# Patient Record
Sex: FEMALE | Race: WHITE | Employment: FULL TIME | URBAN - METROPOLITAN AREA
[De-identification: names, ages, dates, MRNs, and addresses within clinical notes are randomized per-mention and may not be internally consistent; named-entity substitution may affect disease eponyms.]

---

## 2017-07-14 ENCOUNTER — HOSPITAL ENCOUNTER (EMERGENCY)
Age: 32
Discharge: HOME | End: 2017-07-14
Attending: EMERGENCY MEDICINE
Payer: COMMERCIAL

## 2017-07-14 ENCOUNTER — APPOINTMENT (OUTPATIENT)
Dept: CT IMAGING | Age: 32
End: 2017-07-14
Attending: NURSE PRACTITIONER
Payer: COMMERCIAL

## 2017-07-14 VITALS
BODY MASS INDEX: 25.48 KG/M2 | RESPIRATION RATE: 16 BRPM | TEMPERATURE: 97.4 F | HEIGHT: 70 IN | OXYGEN SATURATION: 100 % | DIASTOLIC BLOOD PRESSURE: 91 MMHG | WEIGHT: 178 LBS | SYSTOLIC BLOOD PRESSURE: 127 MMHG | HEART RATE: 74 BPM

## 2017-07-14 DIAGNOSIS — K50.918 CROHN'S DISEASE WITH OTHER COMPLICATION, UNSPECIFIED GASTROINTESTINAL TRACT LOCATION (HCC): Primary | ICD-10-CM

## 2017-07-14 LAB
ALBUMIN SERPL BCP-MCNC: 3.9 G/DL (ref 3.5–5)
ALBUMIN/GLOB SERPL: 1 {RATIO} (ref 1.1–2.2)
ALP SERPL-CCNC: 71 U/L (ref 45–117)
ALT SERPL-CCNC: 18 U/L (ref 12–78)
ANION GAP BLD CALC-SCNC: 10 MMOL/L (ref 5–15)
APPEARANCE UR: CLEAR
AST SERPL W P-5'-P-CCNC: 17 U/L (ref 15–37)
BACTERIA URNS QL MICRO: ABNORMAL /HPF
BASOPHILS # BLD AUTO: 0 K/UL (ref 0–0.1)
BASOPHILS # BLD: 0 % (ref 0–1)
BILIRUB SERPL-MCNC: 0.3 MG/DL (ref 0.2–1)
BILIRUB UR QL CFM: NEGATIVE
BUN SERPL-MCNC: 12 MG/DL (ref 6–20)
BUN/CREAT SERPL: 13 (ref 12–20)
CALCIUM SERPL-MCNC: 9 MG/DL (ref 8.5–10.1)
CAOX CRY URNS QL MICRO: ABNORMAL
CHLORIDE SERPL-SCNC: 108 MMOL/L (ref 97–108)
CO2 SERPL-SCNC: 23 MMOL/L (ref 21–32)
COLOR UR: ABNORMAL
CREAT SERPL-MCNC: 0.94 MG/DL (ref 0.55–1.02)
DIFFERENTIAL METHOD BLD: ABNORMAL
EOSINOPHIL # BLD: 0.1 K/UL (ref 0–0.4)
EOSINOPHIL NFR BLD: 1 % (ref 0–7)
EPITH CASTS URNS QL MICRO: ABNORMAL /LPF
ERYTHROCYTE [DISTWIDTH] IN BLOOD BY AUTOMATED COUNT: 12.7 % (ref 11.5–14.5)
GLOBULIN SER CALC-MCNC: 4.1 G/DL (ref 2–4)
GLUCOSE SERPL-MCNC: 111 MG/DL (ref 65–100)
GLUCOSE UR STRIP.AUTO-MCNC: NEGATIVE MG/DL
HCG UR QL: NEGATIVE
HCT VFR BLD AUTO: 36.6 % (ref 35–47)
HGB BLD-MCNC: 11.9 G/DL (ref 11.5–16)
HGB UR QL STRIP: NEGATIVE
KETONES UR QL STRIP.AUTO: 15 MG/DL
LEUKOCYTE ESTERASE UR QL STRIP.AUTO: NEGATIVE
LYMPHOCYTES # BLD AUTO: 16 % (ref 12–49)
LYMPHOCYTES # BLD: 1.8 K/UL (ref 0.8–3.5)
MCH RBC QN AUTO: 29.5 PG (ref 26–34)
MCHC RBC AUTO-ENTMCNC: 32.5 G/DL (ref 30–36.5)
MCV RBC AUTO: 90.6 FL (ref 80–99)
MONOCYTES # BLD: 0.6 K/UL (ref 0–1)
MONOCYTES NFR BLD AUTO: 5 % (ref 5–13)
NEUTS SEG # BLD: 9 K/UL (ref 1.8–8)
NEUTS SEG NFR BLD AUTO: 78 % (ref 32–75)
NITRITE UR QL STRIP.AUTO: NEGATIVE
PH UR STRIP: 5.5 [PH] (ref 5–8)
PLATELET # BLD AUTO: 233 K/UL (ref 150–400)
POTASSIUM SERPL-SCNC: 3.2 MMOL/L (ref 3.5–5.1)
PROT SERPL-MCNC: 8 G/DL (ref 6.4–8.2)
PROT UR STRIP-MCNC: 30 MG/DL
RBC # BLD AUTO: 4.04 M/UL (ref 3.8–5.2)
RBC #/AREA URNS HPF: ABNORMAL /HPF (ref 0–5)
RBC MORPH BLD: ABNORMAL
SODIUM SERPL-SCNC: 141 MMOL/L (ref 136–145)
SP GR UR REFRACTOMETRY: 1.02 (ref 1–1.03)
UA: UC IF INDICATED,UAUC: ABNORMAL
UROBILINOGEN UR QL STRIP.AUTO: 2 EU/DL (ref 0.2–1)
WBC # BLD AUTO: 11.5 K/UL (ref 3.6–11)
WBC URNS QL MICRO: ABNORMAL /HPF (ref 0–4)

## 2017-07-14 PROCEDURE — 81025 URINE PREGNANCY TEST: CPT

## 2017-07-14 PROCEDURE — 81001 URINALYSIS AUTO W/SCOPE: CPT | Performed by: NURSE PRACTITIONER

## 2017-07-14 PROCEDURE — 96361 HYDRATE IV INFUSION ADD-ON: CPT

## 2017-07-14 PROCEDURE — 74011250636 HC RX REV CODE- 250/636: Performed by: NURSE PRACTITIONER

## 2017-07-14 PROCEDURE — 96375 TX/PRO/DX INJ NEW DRUG ADDON: CPT

## 2017-07-14 PROCEDURE — 87086 URINE CULTURE/COLONY COUNT: CPT | Performed by: NURSE PRACTITIONER

## 2017-07-14 PROCEDURE — 85025 COMPLETE CBC W/AUTO DIFF WBC: CPT | Performed by: NURSE PRACTITIONER

## 2017-07-14 PROCEDURE — 74011636320 HC RX REV CODE- 636/320: Performed by: RADIOLOGY

## 2017-07-14 PROCEDURE — 99283 EMERGENCY DEPT VISIT LOW MDM: CPT

## 2017-07-14 PROCEDURE — 74011250637 HC RX REV CODE- 250/637: Performed by: NURSE PRACTITIONER

## 2017-07-14 PROCEDURE — 80053 COMPREHEN METABOLIC PANEL: CPT | Performed by: NURSE PRACTITIONER

## 2017-07-14 PROCEDURE — 36415 COLL VENOUS BLD VENIPUNCTURE: CPT | Performed by: NURSE PRACTITIONER

## 2017-07-14 PROCEDURE — 74177 CT ABD & PELVIS W/CONTRAST: CPT

## 2017-07-14 PROCEDURE — 96374 THER/PROPH/DIAG INJ IV PUSH: CPT

## 2017-07-14 RX ORDER — ONDANSETRON 2 MG/ML
4 INJECTION INTRAMUSCULAR; INTRAVENOUS
Status: COMPLETED | OUTPATIENT
Start: 2017-07-14 | End: 2017-07-14

## 2017-07-14 RX ORDER — ONDANSETRON 4 MG/1
4 TABLET, ORALLY DISINTEGRATING ORAL
Qty: 20 TAB | Refills: 0 | Status: SHIPPED | OUTPATIENT
Start: 2017-07-14 | End: 2019-12-31

## 2017-07-14 RX ORDER — OXYCODONE AND ACETAMINOPHEN 5; 325 MG/1; MG/1
1 TABLET ORAL
Status: COMPLETED | OUTPATIENT
Start: 2017-07-14 | End: 2017-07-14

## 2017-07-14 RX ORDER — OXYCODONE AND ACETAMINOPHEN 5; 325 MG/1; MG/1
1 TABLET ORAL
Qty: 8 TAB | Refills: 0 | Status: SHIPPED | OUTPATIENT
Start: 2017-07-14 | End: 2018-05-30

## 2017-07-14 RX ORDER — KETOROLAC TROMETHAMINE 30 MG/ML
30 INJECTION, SOLUTION INTRAMUSCULAR; INTRAVENOUS
Status: COMPLETED | OUTPATIENT
Start: 2017-07-14 | End: 2017-07-14

## 2017-07-14 RX ADMIN — ONDANSETRON 4 MG: 2 INJECTION INTRAMUSCULAR; INTRAVENOUS at 22:03

## 2017-07-14 RX ADMIN — IOPAMIDOL 100 ML: 755 INJECTION, SOLUTION INTRAVENOUS at 22:58

## 2017-07-14 RX ADMIN — OXYCODONE HYDROCHLORIDE AND ACETAMINOPHEN 1 TABLET: 5; 325 TABLET ORAL at 23:33

## 2017-07-14 RX ADMIN — KETOROLAC TROMETHAMINE 30 MG: 30 INJECTION, SOLUTION INTRAMUSCULAR at 22:03

## 2017-07-14 RX ADMIN — SODIUM CHLORIDE 1000 ML: 900 INJECTION, SOLUTION INTRAVENOUS at 22:03

## 2017-07-15 NOTE — ED PROVIDER NOTES
HPI Comments: Arianna Fournier is a 27 y.o. female who [place] [facility] [place] of abdominal pain, lightheadedness, and nausea. Pt reports that felt \"band like\" abdominal pain which started in her lower abdomen and radiated to her lower back. She reports pain was consistent throughout course of the day and she had associated symptom of nausea. She reports amenorrhea since IUD placement but felt the pain was similar to menstrual cramps. Pt notes that she went out to eat with her  tonight and did not feel well while at dinner. She states the pain intensified radiated to her epigastric region and she \"felt like I was going to pass out. \" She reports that she was walking around and had been walking around for some time when she became lightheaded, dizzy, and grabbed onto an object as she felt she was going to have a syncopal episode. She had never had anything like this occur in the past. She states that she then came to the ED. No recent abdominal surgeries, denies history of abdominal disease such as IBS, UC, Crohn's. Pt reports NB daily BM. No f/c, vaginal discharge/ spotting, or vomiting/ diarrhea. (-) tobacco/ ETOH/ substance abuse history. PCP: Kristian Vázquez MD    There are no other complaints, changes or physical findings at this time. The history is provided by the patient. Past Medical History:   Diagnosis Date    Fatigue     Headache        Past Surgical History:   Procedure Laterality Date    HX HEENT           Family History:   Problem Relation Age of Onset    Cancer Maternal Grandmother     Headache Maternal Grandmother     Cancer Paternal Grandmother     Cancer Other        Social History     Social History    Marital status:      Spouse name: N/A    Number of children: N/A    Years of education: N/A     Occupational History    Not on file.      Social History Main Topics    Smoking status: Never Smoker    Smokeless tobacco: Not on file    Alcohol use Yes    Drug use: Not on file    Sexual activity: Not on file     Other Topics Concern    Not on file     Social History Narrative         ALLERGIES: Review of patient's allergies indicates no known allergies. Review of Systems   Constitutional: Negative for activity change, appetite change, chills and fever. HENT: Negative for congestion, rhinorrhea, sinus pressure, sneezing and sore throat. Eyes: Negative for pain, discharge and visual disturbance. Respiratory: Negative for cough and shortness of breath. Cardiovascular: Negative for chest pain. Gastrointestinal: Positive for abdominal pain and nausea. Negative for diarrhea and vomiting. Genitourinary: Negative for dysuria, flank pain, frequency and urgency. Musculoskeletal: Negative for arthralgias, back pain, gait problem, joint swelling, myalgias and neck pain. Skin: Negative for color change and rash. Neurological: Negative for dizziness, speech difficulty, weakness, light-headedness, numbness and headaches. Psychiatric/Behavioral: Negative for agitation, behavioral problems and confusion. All other systems reviewed and are negative. Vitals:    07/14/17 2137   BP: 136/87   Pulse: 86   Resp: 16   Temp: 97.4 °F (36.3 °C)   SpO2: 100%   Weight: 80.7 kg (178 lb)   Height: 5' 10\" (1.778 m)            Physical Exam   Constitutional: She is oriented to person, place, and time. She appears well-developed and well-nourished. No distress. HENT:   Head: Normocephalic and atraumatic. Right Ear: External ear normal.   Left Ear: External ear normal.   Nose: Nose normal.   Mouth/Throat: Oropharynx is clear and moist. No oropharyngeal exudate. Eyes: Conjunctivae and EOM are normal. Pupils are equal, round, and reactive to light. Neck: Normal range of motion. Neck supple. Cardiovascular: Normal rate, regular rhythm, normal heart sounds and intact distal pulses.     Pulmonary/Chest: Effort normal and breath sounds normal.   Abdominal: Soft. Bowel sounds are normal. There is tenderness (RLQ ). There is no rebound and no guarding. Musculoskeletal: Normal range of motion. Neurological: She is alert and oriented to person, place, and time. Skin: Skin is warm and dry. Psychiatric: She has a normal mood and affect. Her behavior is normal. Judgment and thought content normal.   Nursing note and vitals reviewed. MDM  Number of Diagnoses or Management Options  Crohn's disease with other complication, unspecified gastrointestinal tract location [facility]):   Diagnosis management comments: DDx: UTI, kidney stone, pyelonephritis, UC, Chron's, ovarian cyst     28 yo F presents with band like abdominal pain x1d. Nausea but no vomiting. Labs unremarkable, CT with concern for Chron's disease. Pt states no family hx of this and no dx of this in the past.  Provided with education on Chron's and need for GI f/u. Pt agreeable to set up f/u ASAP. Reasons to return to ED reviewed/ provided. Pt reports improved symptoms at time of hospital d/c. Amount and/or Complexity of Data Reviewed  Clinical lab tests: ordered and reviewed  Tests in the radiology section of CPT®: ordered and reviewed  Review and summarize past medical records: yes  Discuss the patient with other providers: yes      ED Course       Procedures    LABORATORY TESTS:  Recent Results (from the past 12 hour(s))   CBC WITH AUTOMATED DIFF    Collection Time: 07/14/17  9:51 PM   Result Value Ref Range    WBC 11.5 (H) 3.6 - 11.0 K/uL    RBC 4.04 3.80 - 5.20 M/uL    HGB 11.9 11.5 - 16.0 g/dL    HCT 36.6 35.0 - 47.0 %    MCV 90.6 80.0 - 99.0 FL    MCH 29.5 26.0 - 34.0 PG    MCHC 32.5 30.0 - 36.5 g/dL    RDW 12.7 11.5 - 14.5 %    PLATELET 562 270 - 051 K/uL    NEUTROPHILS 78 (H) 32 - 75 %    LYMPHOCYTES 16 12 - 49 %    MONOCYTES 5 5 - 13 %    EOSINOPHILS 1 0 - 7 %    BASOPHILS 0 0 - 1 %    ABS. NEUTROPHILS 9.0 (H) 1.8 - 8.0 K/UL    ABS.  LYMPHOCYTES 1.8 0.8 - 3.5 K/UL ABS. MONOCYTES 0.6 0.0 - 1.0 K/UL    ABS. EOSINOPHILS 0.1 0.0 - 0.4 K/UL    ABS. BASOPHILS 0.0 0.0 - 0.1 K/UL    DF MANUAL      RBC COMMENTS NORMOCYTIC, NORMOCHROMIC     METABOLIC PANEL, COMPREHENSIVE    Collection Time: 07/14/17  9:51 PM   Result Value Ref Range    Sodium 141 136 - 145 mmol/L    Potassium 3.2 (L) 3.5 - 5.1 mmol/L    Chloride 108 97 - 108 mmol/L    CO2 23 21 - 32 mmol/L    Anion gap 10 5 - 15 mmol/L    Glucose 111 (H) 65 - 100 mg/dL    BUN 12 6 - 20 MG/DL    Creatinine 0.94 0.55 - 1.02 MG/DL    BUN/Creatinine ratio 13 12 - 20      GFR est AA >60 >60 ml/min/1.73m2    GFR est non-AA >60 >60 ml/min/1.73m2    Calcium 9.0 8.5 - 10.1 MG/DL    Bilirubin, total 0.3 0.2 - 1.0 MG/DL    ALT (SGPT) 18 12 - 78 U/L    AST (SGOT) 17 15 - 37 U/L    Alk.  phosphatase 71 45 - 117 U/L    Protein, total 8.0 6.4 - 8.2 g/dL    Albumin 3.9 3.5 - 5.0 g/dL    Globulin 4.1 (H) 2.0 - 4.0 g/dL    A-G Ratio 1.0 (L) 1.1 - 2.2     HCG URINE, QL. - POC    Collection Time: 07/14/17 10:07 PM   Result Value Ref Range    Pregnancy test,urine (POC) NEGATIVE  NEG     URINALYSIS W/ REFLEX CULTURE    Collection Time: 07/14/17 10:25 PM   Result Value Ref Range    Color YELLOW/STRAW      Appearance CLEAR CLEAR      Specific gravity 1.025 1.003 - 1.030      pH (UA) 5.5 5.0 - 8.0      Protein 30 (A) NEG mg/dL    Glucose NEGATIVE  NEG mg/dL    Ketone 15 (A) NEG mg/dL    Blood NEGATIVE  NEG      Urobilinogen 2.0 (H) 0.2 - 1.0 EU/dL    Nitrites NEGATIVE  NEG      Leukocyte Esterase NEGATIVE  NEG      Bilirubin UA, confirm NEGATIVE  NEG      WBC 0-4 0 - 4 /hpf    RBC 0-5 0 - 5 /hpf    Epithelial cells MODERATE (A) FEW /lpf    Bacteria 1+ (A) NEG /hpf    UA:UC IF INDICATED URINE CULTURE ORDERED (A) CNI      CA Oxalate crystals FEW (A) NEG         IMAGING RESULTS:  CT ABD PELV W CONT   Final Result      EXAM:  CT ABD PELV W CONT     INDICATION: Right lower quadrant pain today     COMPARISON: None     CONTRAST:  100 mL of Isovue-370.     TECHNIQUE:   Following the uneventful intravenous administration of 100 cc Isovue-370, thin  axial images were obtained through the abdomen and pelvis. Coronal and sagittal  reconstructions were generated. Oral contrast was not administered. CT dose  reduction was achieved through use of a standardized protocol tailored for this  examination and automatic exposure control for dose modulation.     FINDINGS:   LUNG BASES: Clear. INCIDENTALLY IMAGED HEART AND MEDIASTINUM: Unremarkable. LIVER: Periportal edema is noted likely related to administration of fluid. GALLBLADDER: Contracted but no stones are identified. SPLEEN: No mass. PANCREAS: No mass or ductal dilatation. ADRENALS: Unremarkable. KIDNEYS: No mass, calculus, or hydronephrosis. STOMACH: Unremarkable. SMALL BOWEL: There is wall thickening of the terminal ileum with associated  inflammation. There is dilatation of the distal ileum. COLON: No dilatation or wall thickening. APPENDIX: Unremarkable. PERITONEUM: No ascites or pneumoperitoneum. RETROPERITONEUM: No lymphadenopathy or aortic aneurysm. REPRODUCTIVE ORGANS: Intrauterine device projects in satisfactory position. URINARY BLADDER: No mass or calculus. BONES: No destructive bone lesion. ADDITIONAL COMMENTS: N/A     IMPRESSION  IMPRESSION:  Inflammation involving the terminal ileum with mild dilatation of the distal  ileum. There is associated inflammation. Findings are concerning for Crohn's  disease.     MEDICATIONS GIVEN:  Medications   sodium chloride 0.9 % bolus infusion 1,000 mL (0 mL IntraVENous IV Completed 7/14/17 2303)   ketorolac (TORADOL) injection 30 mg (30 mg IntraVENous Given 7/14/17 2203)   ondansetron (ZOFRAN) injection 4 mg (4 mg IntraVENous Given 7/14/17 2203)   iopamidol (ISOVUE-370) 76 % injection 100 mL (100 mL IntraVENous Given 7/14/17 2258)   oxyCODONE-acetaminophen (PERCOCET) 5-325 mg per tablet 1 Tab (1 Tab Oral Given 7/14/17 2333) IMPRESSION:  1. Crohn's disease with other complication, unspecified gastrointestinal tract location ([facility] [address]        PLAN:  1. Discharge Medication List as of 7/14/2017 11:19 PM      START taking these medications    Details   oxyCODONE-acetaminophen (PERCOCET) 5-325 mg per tablet Take 1 Tab by mouth every four (4) hours as needed for Pain. Max Daily Amount: 6 Tabs., Print, Disp-8 Tab, R-0      ondansetron (ZOFRAN ODT) 4 mg disintegrating tablet Take 1 Tab by mouth every eight (8) hours as needed for Nausea. , Print, Disp-20 Tab, R-0         CONTINUE these medications which have NOT CHANGED    Details   topiramate (TOPAMAX) 100 mg tablet Take 1 Tab by mouth nightly., Normal, Disp-30 Tab, R-3           2. Follow-up Information     Follow up With Details Comments [address], [place] Schedule an appointment as soon as possible for a visit for GI follow up  [facility]  [phone]      [facility] EMERGENCY DEPT Go to As needed, If symptoms worsen [address]    Will Briones MD Schedule an appointment as soon as possible for a visit  [facility]  [phone]          3. Return to ED if worse     Discharge Note:    The patient is ready for discharge. The patient's signs, symptoms, diagnosis, and discharge instruction have been discussed and the patient has conveyed their understanding. The patient is to follow up as recommended or return to the ER should their symptoms worsen. Plan has been discussed and the patient is in agreement.     Epi Smith NP

## 2017-07-15 NOTE — DISCHARGE INSTRUCTIONS
We hope that we have addressed all of your medical concerns. The examination and treatment you received in the Emergency Department were for an emergent problem and were not intended as complete care. It is important that you follow up with your healthcare provider(s) for ongoing care. If your symptoms worsen or do not improve as expected, and you are unable to reach your usual health care provider(s), you should return to the Emergency Department. Today's healthcare is undergoing tremendous change, and patient satisfaction surveys are one of the many tools to assess the quality of medical care. You may receive a survey from the [employer] regarding your experience in the Emergency Department. I hope that your experience has been completely positive, particularly the medical care that I provided. As such, please participate in the survey; anything less than excellent does not meet my expectations or intentions. Formerly [facility] and [employer] participate in nationally recognized quality of care measures. If your blood pressure is greater than 120/80, as reported below, we urge that you seek medical care to address the potential of high blood pressure, commonly known as hypertension. Hypertension can be hereditary or can be caused by certain medical conditions, pain, stress, or \"white coat syndrome. \"       Please make an appointment with your health care provider(s) for follow up of your Emergency Department visit. VITALS:   Patient Vitals for the past 8 hrs:   Temp Pulse Resp BP SpO2   07/14/17 2137 97.4 °F (36.3 °C) 86 16 136/87 100 %          Thank you for allowing us to provide you with medical care today. We realize that you have many choices for your emergency care needs. Please choose us in the future for any continued health care needs. Kendra Toro  [address]   Office: [phone]            Recent Results (from the past 24 hour(s))   CBC WITH AUTOMATED DIFF    Collection Time: 07/14/17  9:51 PM   Result Value Ref Range    WBC 11.5 (H) 3.6 - 11.0 K/uL    RBC 4.04 3.80 - 5.20 M/uL    HGB 11.9 11.5 - 16.0 g/dL    HCT 36.6 35.0 - 47.0 %    MCV 90.6 80.0 - 99.0 FL    MCH 29.5 26.0 - 34.0 PG    MCHC 32.5 30.0 - 36.5 g/dL    RDW 12.7 11.5 - 14.5 %    PLATELET 202 758 - 736 K/uL    NEUTROPHILS 78 (H) 32 - 75 %    LYMPHOCYTES 16 12 - 49 %    MONOCYTES 5 5 - 13 %    EOSINOPHILS 1 0 - 7 %    BASOPHILS 0 0 - 1 %    ABS. NEUTROPHILS 9.0 (H) 1.8 - 8.0 K/UL    ABS. LYMPHOCYTES 1.8 0.8 - 3.5 K/UL    ABS. MONOCYTES 0.6 0.0 - 1.0 K/UL    ABS. EOSINOPHILS 0.1 0.0 - 0.4 K/UL    ABS. BASOPHILS 0.0 0.0 - 0.1 K/UL    DF MANUAL      RBC COMMENTS NORMOCYTIC, NORMOCHROMIC     METABOLIC PANEL, COMPREHENSIVE    Collection Time: 07/14/17  9:51 PM   Result Value Ref Range    Sodium 141 136 - 145 mmol/L    Potassium 3.2 (L) 3.5 - 5.1 mmol/L    Chloride 108 97 - 108 mmol/L    CO2 23 21 - 32 mmol/L    Anion gap 10 5 - 15 mmol/L    Glucose 111 (H) 65 - 100 mg/dL    BUN 12 6 - 20 MG/DL    Creatinine 0.94 0.55 - 1.02 MG/DL    BUN/Creatinine ratio 13 12 - 20      GFR est AA >60 >60 ml/min/1.73m2    GFR est non-AA >60 >60 ml/min/1.73m2    Calcium 9.0 8.5 - 10.1 MG/DL    Bilirubin, total 0.3 0.2 - 1.0 MG/DL    ALT (SGPT) 18 12 - 78 U/L    AST (SGOT) 17 15 - 37 U/L    Alk.  phosphatase 71 45 - 117 U/L    Protein, total 8.0 6.4 - 8.2 g/dL    Albumin 3.9 3.5 - 5.0 g/dL    Globulin 4.1 (H) 2.0 - 4.0 g/dL    A-G Ratio 1.0 (L) 1.1 - 2.2     HCG URINE, QL. - POC    Collection Time: 07/14/17 10:07 PM   Result Value Ref Range    Pregnancy test,urine (POC) NEGATIVE  NEG     URINALYSIS W/ REFLEX CULTURE    Collection Time: 07/14/17 10:25 PM   Result Value Ref Range    Color YELLOW/STRAW      Appearance CLEAR CLEAR      Specific gravity 1.025 1.003 - 1.030      pH (UA) 5.5 5.0 - 8.0      Protein 30 (A) NEG mg/dL    Glucose NEGATIVE  NEG mg/dL    Ketone 15 (A) NEG mg/dL    Blood NEGATIVE  NEG      Urobilinogen 2.0 (H) 0.2 - 1.0 EU/dL    Nitrites NEGATIVE  NEG      Leukocyte Esterase NEGATIVE  NEG      Bilirubin UA, confirm NEGATIVE  NEG      WBC 0-4 0 - 4 /hpf    RBC 0-5 0 - 5 /hpf    Epithelial cells MODERATE (A) FEW /lpf    Bacteria 1+ (A) NEG /hpf    UA:UC IF INDICATED URINE CULTURE ORDERED (A) CNI      CA Oxalate crystals FEW (A) NEG         Ct Abd Pelv W Cont    Result Date: 7/14/2017  EXAM:  CT ABD PELV W CONT INDICATION: Right lower quadrant pain today COMPARISON: None CONTRAST:  100 mL of Isovue-370. TECHNIQUE: Following the uneventful intravenous administration of 100 cc Isovue-370, thin axial images were obtained through the abdomen and pelvis. Coronal and sagittal reconstructions were generated. Oral contrast was not administered. CT dose reduction was achieved through use of a standardized protocol tailored for this examination and automatic exposure control for dose modulation. FINDINGS: LUNG BASES: Clear. INCIDENTALLY IMAGED HEART AND MEDIASTINUM: Unremarkable. LIVER: Periportal edema is noted likely related to administration of fluid. GALLBLADDER: Contracted but no stones are identified. SPLEEN: No mass. PANCREAS: No mass or ductal dilatation. ADRENALS: Unremarkable. KIDNEYS: No mass, calculus, or hydronephrosis. STOMACH: Unremarkable. SMALL BOWEL: There is wall thickening of the terminal ileum with associated inflammation. There is dilatation of the distal ileum. COLON: No dilatation or wall thickening. APPENDIX: Unremarkable. PERITONEUM: No ascites or pneumoperitoneum. RETROPERITONEUM: No lymphadenopathy or aortic aneurysm. REPRODUCTIVE ORGANS: Intrauterine device projects in satisfactory position. URINARY BLADDER: No mass or calculus. BONES: No destructive bone lesion. ADDITIONAL COMMENTS: N/A     IMPRESSION: Inflammation involving the terminal ileum with mild dilatation of the distal ileum.  There is associated inflammation. Findings are concerning for Crohn's disease. Crohn's Disease: Care Instructions  Your Care Instructions    Crohn's disease is a lifelong inflammatory bowel disease (IBD). Parts of the digestive tract get swollen and irritated and may develop deep sores called ulcers. Crohn's disease usually occurs in the last part of the small intestine and the first part of the large intestine. But it can develop anywhere from the mouth to the anus. The main symptoms of Crohn's disease are belly pain, diarrhea, fever, and weight loss. Some people may have constipation. Crohn's disease also sometimes causes problems with the joints, eyes, or skin. Your symptoms may be mild at some times and severe at others. The disease can also go into remission, which means that it is not active and you have no symptoms. Bad attacks of Crohn's disease often have to be treated in the hospital so that you can get medicines and liquids through a tube in your vein, called an IV. This gives your digestive system time to rest and recover. Talk with your doctor about the best treatments for you. You may need medicines that help prevent or treat flare-ups of the disease. You may need surgery to remove part of your bowel if you have an abnormal opening in the bowel (fistula), an abscess, or a bowel obstruction. In some cases, surgery is needed if medicines do not work. But symptoms often return to other areas of the intestines after surgery. Learning good self-care can help you reduce your symptoms and manage Crohn's disease. Follow-up care is a key part of your treatment and safety. Be sure to make and go to all appointments, and call your doctor if you are having problems. Its also a good idea to know your test results and keep a list of the medicines you take. How can you care for yourself at home? · Take your medicines exactly as prescribed. Call your doctor if you think you are having a problem with your medicine.  You will get more details on the specific medicines your doctor prescribes. · Do not take anti-inflammatory medicines, such as aspirin, ibuprofen (Advil, Motrin), or naproxen (Aleve). They may make your symptoms worse. Do not take any other medicines or herbal products without talking to your doctor first.  · Avoid foods that make your symptoms worse. These might include milk, alcohol, high-fiber foods, or spicy foods. · Eat a healthy diet. Make sure to get enough iron. Rectal bleeding may make you lose iron. Good sources of iron include beef, lentils, spinach, raisins, and iron-enriched breads and cereals. · Drink liquid meal replacements if your doctor recommends them. These are high in calories and contain vitamins and minerals. Severe symptoms may make it hard for your body to absorb vitamins and minerals. · Do not smoke. Smoking makes Crohn's disease worse. If you need help quitting, talk to your doctor about stop-smoking programs and medicines. These can increase your chances of quitting for good. · Seek support from friends and family to help cope with Crohn's disease. The illness can affect all parts of your life. Get counseling if you need it. When should you call for help? Call 911 anytime you think you may need emergency care. For example, call if:  · You have severe belly pain. · You passed out (lost consciousness). Call your doctor now or seek immediate medical care if:  · You have signs of needing more fluids. You have sunken eyes and a dry mouth, and you pass only a little dark urine. · You have pain and swelling in the anal area, or you have pus draining from the anal area. · You have a fever or shaking chills. · Your belly is bloated. Watch closely for changes in your health, and be sure to contact your doctor if:  · Your symptoms get worse. · You have diarrhea for more than 2 weeks. · Your pain is not steadily getting better. · You have unexplained weight loss.   Where can you learn more?  Go to http://lul-malick.info/. Enter 21  in the search box to learn more about \"Crohn's Disease: Care Instructions. \"  Current as of: August 9, 2016  Content Version: 11.3  © 9445-8859 [employer]. Care instructions adapted under license by [employer] (which disclaims liability or warranty for this information). If you have questions about a medical condition or this instruction, always ask your healthcare professional. [place] [address] any warranty or liability for your use of this information.

## 2017-07-15 NOTE — ED TRIAGE NOTES
Patient reports abdominal pain and back pain throughout the day, went to dinner and pain became much worse causing her to become dizzy and feel faint. Denies N/V. States feels like \"a band\" around her.

## 2017-07-16 LAB
BACTERIA SPEC CULT: NORMAL
CC UR VC: NORMAL
SERVICE CMNT-IMP: NORMAL

## 2017-12-08 ENCOUNTER — HOSPITAL ENCOUNTER (OUTPATIENT)
Dept: GENERAL RADIOLOGY | Age: 32
Discharge: HOME | End: 2017-12-08
Attending: INTERNAL MEDICINE
Payer: COMMERCIAL

## 2017-12-08 DIAGNOSIS — K50.00 CROHN'S DISEASE INVOLVING TERMINAL ILEUM (HCC): ICD-10-CM

## 2017-12-08 PROCEDURE — 74249 XR UPPER GI AIR CONT/SMALL BOWEL: CPT

## 2018-05-30 ENCOUNTER — APPOINTMENT (OUTPATIENT)
Dept: CT IMAGING | Age: 33
DRG: 387 | End: 2018-05-30
Attending: EMERGENCY MEDICINE
Payer: COMMERCIAL

## 2018-05-30 ENCOUNTER — HOSPITAL ENCOUNTER (INPATIENT)
Age: 33
LOS: 2 days | Discharge: HOME | DRG: 387 | End: 2018-06-01
Attending: EMERGENCY MEDICINE | Admitting: INTERNAL MEDICINE
Payer: COMMERCIAL

## 2018-05-30 DIAGNOSIS — K50.919 ACUTE CROHN'S DISEASE WITH COMPLICATION (HCC): ICD-10-CM

## 2018-05-30 DIAGNOSIS — R10.31 ABDOMINAL PAIN, RIGHT LOWER QUADRANT: Primary | ICD-10-CM

## 2018-05-30 DIAGNOSIS — D72.829 LEUKOCYTOSIS, UNSPECIFIED TYPE: ICD-10-CM

## 2018-05-30 PROBLEM — R10.9 ABDOMINAL PAIN: Status: ACTIVE | Noted: 2018-05-30

## 2018-05-30 PROBLEM — K50.90 CROHN'S DISEASE (HCC): Status: ACTIVE | Noted: 2018-05-30

## 2018-05-30 PROBLEM — R11.0 NAUSEA: Status: ACTIVE | Noted: 2018-05-30

## 2018-05-30 LAB
ALBUMIN SERPL-MCNC: 3.6 G/DL (ref 3.5–5)
ALBUMIN/GLOB SERPL: 0.9 {RATIO} (ref 1.1–2.2)
ALP SERPL-CCNC: 49 U/L (ref 45–117)
ALT SERPL-CCNC: 15 U/L (ref 12–78)
ANION GAP SERPL CALC-SCNC: 7 MMOL/L (ref 5–15)
APPEARANCE UR: ABNORMAL
AST SERPL-CCNC: 24 U/L (ref 15–37)
BACTERIA URNS QL MICRO: NEGATIVE /HPF
BASOPHILS # BLD: 0 K/UL (ref 0–0.1)
BASOPHILS NFR BLD: 0 % (ref 0–1)
BILIRUB SERPL-MCNC: 0.4 MG/DL (ref 0.2–1)
BILIRUB UR QL: NEGATIVE
BUN SERPL-MCNC: 9 MG/DL (ref 6–20)
BUN/CREAT SERPL: 13 (ref 12–20)
CALCIUM SERPL-MCNC: 9 MG/DL (ref 8.5–10.1)
CHLORIDE SERPL-SCNC: 105 MMOL/L (ref 97–108)
CO2 SERPL-SCNC: 26 MMOL/L (ref 21–32)
COLOR UR: ABNORMAL
CREAT SERPL-MCNC: 0.72 MG/DL (ref 0.55–1.02)
DIFFERENTIAL METHOD BLD: ABNORMAL
EOSINOPHIL # BLD: 0.1 K/UL (ref 0–0.4)
EOSINOPHIL NFR BLD: 1 % (ref 0–7)
EPITH CASTS URNS QL MICRO: ABNORMAL /LPF
ERYTHROCYTE [DISTWIDTH] IN BLOOD BY AUTOMATED COUNT: 11.6 % (ref 11.5–14.5)
GLOBULIN SER CALC-MCNC: 3.8 G/DL (ref 2–4)
GLUCOSE SERPL-MCNC: 96 MG/DL (ref 65–100)
GLUCOSE UR STRIP.AUTO-MCNC: NEGATIVE MG/DL
HCG UR QL: NEGATIVE
HCT VFR BLD AUTO: 36.9 % (ref 35–47)
HGB BLD-MCNC: 12.3 G/DL (ref 11.5–16)
HGB UR QL STRIP: NEGATIVE
HYALINE CASTS URNS QL MICRO: ABNORMAL /LPF (ref 0–5)
IMM GRANULOCYTES # BLD: 0 K/UL (ref 0–0.04)
IMM GRANULOCYTES NFR BLD AUTO: 0 % (ref 0–0.5)
KETONES UR QL STRIP.AUTO: NEGATIVE MG/DL
LEUKOCYTE ESTERASE UR QL STRIP.AUTO: NEGATIVE
LIPASE SERPL-CCNC: 149 U/L (ref 73–393)
LYMPHOCYTES # BLD: 1.9 K/UL (ref 0.8–3.5)
LYMPHOCYTES NFR BLD: 15 % (ref 12–49)
MCH RBC QN AUTO: 30.5 PG (ref 26–34)
MCHC RBC AUTO-ENTMCNC: 33.3 G/DL (ref 30–36.5)
MCV RBC AUTO: 91.6 FL (ref 80–99)
MONOCYTES # BLD: 0.7 K/UL (ref 0–1)
MONOCYTES NFR BLD: 6 % (ref 5–13)
NEUTS SEG # BLD: 9.5 K/UL (ref 1.8–8)
NEUTS SEG NFR BLD: 78 % (ref 32–75)
NITRITE UR QL STRIP.AUTO: NEGATIVE
NRBC # BLD: 0 K/UL (ref 0–0.01)
NRBC BLD-RTO: 0 PER 100 WBC
PH UR STRIP: 7.5 [PH] (ref 5–8)
PLATELET # BLD AUTO: 222 K/UL (ref 150–400)
PMV BLD AUTO: 10.7 FL (ref 8.9–12.9)
POTASSIUM SERPL-SCNC: 4.1 MMOL/L (ref 3.5–5.1)
PROT SERPL-MCNC: 7.4 G/DL (ref 6.4–8.2)
PROT UR STRIP-MCNC: NEGATIVE MG/DL
RBC # BLD AUTO: 4.03 M/UL (ref 3.8–5.2)
RBC #/AREA URNS HPF: ABNORMAL /HPF (ref 0–5)
SODIUM SERPL-SCNC: 138 MMOL/L (ref 136–145)
SP GR UR REFRACTOMETRY: 1.02 (ref 1–1.03)
UR CULT HOLD, URHOLD: NORMAL
UROBILINOGEN UR QL STRIP.AUTO: 1 EU/DL (ref 0.2–1)
WBC # BLD AUTO: 12.2 K/UL (ref 3.6–11)
WBC URNS QL MICRO: ABNORMAL /HPF (ref 0–4)

## 2018-05-30 PROCEDURE — 74177 CT ABD & PELVIS W/CONTRAST: CPT

## 2018-05-30 PROCEDURE — 74011250636 HC RX REV CODE- 250/636: Performed by: EMERGENCY MEDICINE

## 2018-05-30 PROCEDURE — 65270000029 HC RM PRIVATE

## 2018-05-30 PROCEDURE — 87040 BLOOD CULTURE FOR BACTERIA: CPT | Performed by: NURSE PRACTITIONER

## 2018-05-30 PROCEDURE — 99285 EMERGENCY DEPT VISIT HI MDM: CPT

## 2018-05-30 PROCEDURE — 81001 URINALYSIS AUTO W/SCOPE: CPT | Performed by: EMERGENCY MEDICINE

## 2018-05-30 PROCEDURE — 96361 HYDRATE IV INFUSION ADD-ON: CPT

## 2018-05-30 PROCEDURE — 85025 COMPLETE CBC W/AUTO DIFF WBC: CPT | Performed by: EMERGENCY MEDICINE

## 2018-05-30 PROCEDURE — 81025 URINE PREGNANCY TEST: CPT

## 2018-05-30 PROCEDURE — 94761 N-INVAS EAR/PLS OXIMETRY MLT: CPT

## 2018-05-30 PROCEDURE — 74011250637 HC RX REV CODE- 250/637: Performed by: INTERNAL MEDICINE

## 2018-05-30 PROCEDURE — 36415 COLL VENOUS BLD VENIPUNCTURE: CPT | Performed by: NURSE PRACTITIONER

## 2018-05-30 PROCEDURE — 74011636320 HC RX REV CODE- 636/320: Performed by: RADIOLOGY

## 2018-05-30 PROCEDURE — 83690 ASSAY OF LIPASE: CPT | Performed by: EMERGENCY MEDICINE

## 2018-05-30 PROCEDURE — 96374 THER/PROPH/DIAG INJ IV PUSH: CPT

## 2018-05-30 PROCEDURE — 74011000250 HC RX REV CODE- 250: Performed by: EMERGENCY MEDICINE

## 2018-05-30 PROCEDURE — 96375 TX/PRO/DX INJ NEW DRUG ADDON: CPT

## 2018-05-30 PROCEDURE — 74011250636 HC RX REV CODE- 250/636: Performed by: INTERNAL MEDICINE

## 2018-05-30 PROCEDURE — 80053 COMPREHEN METABOLIC PANEL: CPT | Performed by: EMERGENCY MEDICINE

## 2018-05-30 RX ORDER — ENOXAPARIN SODIUM 100 MG/ML
40 INJECTION SUBCUTANEOUS EVERY 24 HOURS
Status: DISCONTINUED | OUTPATIENT
Start: 2018-05-30 | End: 2018-06-01 | Stop reason: HOSPADM

## 2018-05-30 RX ORDER — ZOLPIDEM TARTRATE 5 MG/1
5 TABLET ORAL
Status: DISCONTINUED | OUTPATIENT
Start: 2018-05-30 | End: 2018-06-01 | Stop reason: HOSPADM

## 2018-05-30 RX ORDER — FAMOTIDINE 10 MG/ML
20 INJECTION INTRAVENOUS
Status: COMPLETED | OUTPATIENT
Start: 2018-05-30 | End: 2018-05-30

## 2018-05-30 RX ORDER — SODIUM CHLORIDE 0.9 % (FLUSH) 0.9 %
5-10 SYRINGE (ML) INJECTION AS NEEDED
Status: DISCONTINUED | OUTPATIENT
Start: 2018-05-30 | End: 2018-06-01 | Stop reason: HOSPADM

## 2018-05-30 RX ORDER — MORPHINE SULFATE 4 MG/ML
2 INJECTION INTRAVENOUS
Status: DISCONTINUED | OUTPATIENT
Start: 2018-05-30 | End: 2018-06-01 | Stop reason: HOSPADM

## 2018-05-30 RX ORDER — NALOXONE HYDROCHLORIDE 0.4 MG/ML
0.4 INJECTION, SOLUTION INTRAMUSCULAR; INTRAVENOUS; SUBCUTANEOUS AS NEEDED
Status: DISCONTINUED | OUTPATIENT
Start: 2018-05-30 | End: 2018-06-01 | Stop reason: HOSPADM

## 2018-05-30 RX ORDER — HYDROMORPHONE HYDROCHLORIDE 2 MG/ML
1 INJECTION, SOLUTION INTRAMUSCULAR; INTRAVENOUS; SUBCUTANEOUS ONCE
Status: COMPLETED | OUTPATIENT
Start: 2018-05-30 | End: 2018-05-30

## 2018-05-30 RX ORDER — ACETAMINOPHEN 325 MG/1
650 TABLET ORAL
Status: DISCONTINUED | OUTPATIENT
Start: 2018-05-30 | End: 2018-06-01 | Stop reason: HOSPADM

## 2018-05-30 RX ORDER — HYDROCODONE BITARTRATE AND ACETAMINOPHEN 5; 325 MG/1; MG/1
1 TABLET ORAL
Status: DISCONTINUED | OUTPATIENT
Start: 2018-05-30 | End: 2018-06-01 | Stop reason: HOSPADM

## 2018-05-30 RX ORDER — ONDANSETRON 2 MG/ML
4 INJECTION INTRAMUSCULAR; INTRAVENOUS
Status: DISCONTINUED | OUTPATIENT
Start: 2018-05-30 | End: 2018-06-01 | Stop reason: HOSPADM

## 2018-05-30 RX ORDER — ONDANSETRON 2 MG/ML
4 INJECTION INTRAMUSCULAR; INTRAVENOUS
Status: COMPLETED | OUTPATIENT
Start: 2018-05-30 | End: 2018-05-30

## 2018-05-30 RX ORDER — MELATONIN
1000 DAILY
COMMUNITY
End: 2020-02-22

## 2018-05-30 RX ORDER — SODIUM CHLORIDE 0.9 % (FLUSH) 0.9 %
5-10 SYRINGE (ML) INJECTION EVERY 8 HOURS
Status: DISCONTINUED | OUTPATIENT
Start: 2018-05-30 | End: 2018-06-01 | Stop reason: HOSPADM

## 2018-05-30 RX ORDER — SODIUM CHLORIDE 9 MG/ML
100 INJECTION, SOLUTION INTRAVENOUS CONTINUOUS
Status: DISCONTINUED | OUTPATIENT
Start: 2018-05-30 | End: 2018-05-31

## 2018-05-30 RX ADMIN — Medication 10 ML: at 02:09

## 2018-05-30 RX ADMIN — METHYLPREDNISOLONE SODIUM SUCCINATE 40 MG: 40 INJECTION, POWDER, FOR SOLUTION INTRAMUSCULAR; INTRAVENOUS at 21:39

## 2018-05-30 RX ADMIN — Medication 10 ML: at 01:40

## 2018-05-30 RX ADMIN — Medication 10 ML: at 21:46

## 2018-05-30 RX ADMIN — FAMOTIDINE 20 MG: 10 INJECTION, SOLUTION INTRAVENOUS at 02:18

## 2018-05-30 RX ADMIN — ENOXAPARIN SODIUM 40 MG: 100 INJECTION SUBCUTANEOUS at 09:21

## 2018-05-30 RX ADMIN — HYDROCODONE BITARTRATE AND ACETAMINOPHEN 1 TABLET: 5; 325 TABLET ORAL at 04:45

## 2018-05-30 RX ADMIN — METHYLPREDNISOLONE SODIUM SUCCINATE 125 MG: 125 INJECTION, POWDER, FOR SOLUTION INTRAMUSCULAR; INTRAVENOUS at 04:18

## 2018-05-30 RX ADMIN — MORPHINE SULFATE 2 MG: 4 INJECTION INTRAVENOUS at 09:28

## 2018-05-30 RX ADMIN — SODIUM CHLORIDE 1000 ML: 900 INJECTION, SOLUTION INTRAVENOUS at 02:08

## 2018-05-30 RX ADMIN — HYDROCODONE BITARTRATE AND ACETAMINOPHEN 1 TABLET: 5; 325 TABLET ORAL at 13:01

## 2018-05-30 RX ADMIN — ONDANSETRON 4 MG: 2 INJECTION INTRAMUSCULAR; INTRAVENOUS at 04:52

## 2018-05-30 RX ADMIN — METHYLPREDNISOLONE SODIUM SUCCINATE 40 MG: 40 INJECTION, POWDER, FOR SOLUTION INTRAMUSCULAR; INTRAVENOUS at 16:01

## 2018-05-30 RX ADMIN — ONDANSETRON 4 MG: 2 INJECTION INTRAMUSCULAR; INTRAVENOUS at 09:28

## 2018-05-30 RX ADMIN — SODIUM CHLORIDE 100 ML/HR: 900 INJECTION, SOLUTION INTRAVENOUS at 16:01

## 2018-05-30 RX ADMIN — ZOLPIDEM TARTRATE 5 MG: 5 TABLET ORAL at 21:43

## 2018-05-30 RX ADMIN — IOPAMIDOL 98 ML: 755 INJECTION, SOLUTION INTRAVENOUS at 03:05

## 2018-05-30 RX ADMIN — METHYLPREDNISOLONE SODIUM SUCCINATE 40 MG: 40 INJECTION, POWDER, FOR SOLUTION INTRAMUSCULAR; INTRAVENOUS at 09:24

## 2018-05-30 RX ADMIN — SODIUM CHLORIDE 100 ML/HR: 900 INJECTION, SOLUTION INTRAVENOUS at 04:18

## 2018-05-30 RX ADMIN — HYDROCODONE BITARTRATE AND ACETAMINOPHEN 1 TABLET: 5; 325 TABLET ORAL at 20:19

## 2018-05-30 RX ADMIN — HYDROMORPHONE HYDROCHLORIDE 1 MG: 2 INJECTION INTRAMUSCULAR; INTRAVENOUS; SUBCUTANEOUS at 02:17

## 2018-05-30 RX ADMIN — ONDANSETRON 4 MG: 2 INJECTION INTRAMUSCULAR; INTRAVENOUS at 02:08

## 2018-05-30 NOTE — PROGRESS NOTES
Primary Nurse Omer Wild and Dasia Lin, RN performed a dual skin assessment on this patient No impairment noted  Devin score is 22

## 2018-05-30 NOTE — CONSULTS
[address] [place] [address]. [address] [place] [address] NP  [phone]                    GASTROENTEROLOGY CONSULTATION NOTE              NAME:  Yoanna Santana   :   1985   MRN:   [id number]       Referring Physician:    Dr. Jael Price Date:   2018 10:40 AM    Chief Complaint:    Abdominal Pain. History of Present Illness:    Patient is a 26 y.o. who we were asked to see in consultation for the above complaint. Yesterday evening the patient developed acute onset sharp right lower quadrant abdominal pain. The pain is persistent. It starts off as dull then turns sharp. It is made worse with movement, nothing makes it better. She rates at 9 of 10 at its worst.  She denies bloody stools and diarrhea. Endorses chills, nausea, and vomiting. She has a history of Crohns disease and has been taking Humira Injections every other week. PMH:  Past Medical History:   Diagnosis Date    Fatigue     Headache        PSH:  Past Surgical History:   Procedure Laterality Date    HX HEENT         Allergies:  No Known Allergies    Home Medications:  Prior to Admission Medications   Prescriptions Last Dose Informant Patient Reported? Taking? adalimumab (HUMIRA) 40 mg/0.8 mL injection   Yes Yes   Sig: by SubCUTAneous route once. Indications: Crohn's Disease   ondansetron (ZOFRAN ODT) 4 mg disintegrating tablet Unknown at Unknown time  No No   Sig: Take 1 Tab by mouth every eight (8) hours as needed for Nausea. oxyCODONE-acetaminophen (PERCOCET) 5-325 mg per tablet Not Taking at Unknown time  No No   Sig: Take 1 Tab by mouth every four (4) hours as needed for Pain. Max Daily Amount: 6 Tabs. topiramate (TOPAMAX) 100 mg tablet Unknown at Unknown time  No No   Sig: Take 1 Tab by mouth nightly.       Facility-Administered Medications: None       Hospital Medications:  Current Facility-Administered Medications   Medication Dose Route Frequency    sodium chloride (NS) flush 5-10 mL  5-10 mL IntraVENous Q8H  sodium chloride (NS) flush 5-10 mL  5-10 mL IntraVENous PRN    0.9% sodium chloride infusion  100 mL/hr IntraVENous CONTINUOUS    sodium chloride (NS) flush 5-10 mL  5-10 mL IntraVENous Q8H    sodium chloride (NS) flush 5-10 mL  5-10 mL IntraVENous PRN    ondansetron (ZOFRAN) injection 4 mg  4 mg IntraVENous Q4H PRN    acetaminophen (TYLENOL) tablet 650 mg  650 mg Oral Q4H PRN    HYDROcodone-acetaminophen (NORCO) 5-325 mg per tablet 1 Tab  1 Tab Oral Q4H PRN    morphine injection 2 mg  2 mg IntraVENous Q4H PRN    naloxone (NARCAN) injection 0.4 mg  0.4 mg IntraVENous PRN    enoxaparin (LOVENOX) injection 40 mg  40 mg SubCUTAneous Q24H    methylPREDNISolone (PF) (SOLU-MEDROL) injection 40 mg  40 mg IntraVENous Q6H     Current Outpatient Prescriptions   Medication Sig    adalimumab (HUMIRA) 40 mg/0.8 mL injection by SubCUTAneous route once. Indications: Crohn's Disease    oxyCODONE-acetaminophen (PERCOCET) 5-325 mg per tablet Take 1 Tab by mouth every four (4) hours as needed for Pain. Max Daily Amount: 6 Tabs.  ondansetron (ZOFRAN ODT) 4 mg disintegrating tablet Take 1 Tab by mouth every eight (8) hours as needed for Nausea.  topiramate (TOPAMAX) 100 mg tablet Take 1 Tab by mouth nightly.        Social History:  Social History   Substance Use Topics    Smoking status: Never Smoker    Smokeless tobacco: Never Used    Alcohol use Yes      Comment: socially       Family History:  Family History   Problem Relation Age of Onset    Cancer Maternal Grandmother     Headache Maternal Grandmother     Cancer Paternal Grandmother     Cancer Other        Review of Systems:  Constitutional: negative fever, negative chills, negative weight loss  Eyes:   negative visual changes  ENT:   negative sore throat, tongue or lip swelling  Respiratory:  negative cough, negative dyspnea  Cards:  negative for chest pain, palpitations, lower extremity edema  GI:   See HPI  :  negative for frequency, dysuria  Integument:  negative for rash and pruritus  Heme:  negative for easy bruising and gum/nose bleeding  Musculoskel: negative for myalgias,  back pain and muscle weakness  Neuro: negative for headaches, dizziness, vertigo  Psych:  negative for feelings of anxiety, depression     Objective:   Patient Vitals for the past 8 hrs:   BP Temp Pulse Resp SpO2 Weight   05/30/18 0900 121/73 - - - 98 % -   05/30/18 0802 - 97.9 °F (36.6 °C) - - - -   05/30/18 0801 - - - - 98 % -   05/30/18 0800 (!) 139/92 - - - - -   05/30/18 0700 136/77 - - - 98 % -   05/30/18 0630 138/89 97.9 °F (36.6 °C) 72 18 99 % 76.2 kg (168 lb)   05/30/18 0430 133/88 - - - 99 % -   05/30/18 0415 138/84 - - - 99 % -   05/30/18 0400 144/90 - - - 99 % -   05/30/18 0345 (!) 150/92 - - - 100 % -   05/30/18 0330 150/89 - - - 100 % -   05/30/18 0320 - - - - 100 % -   05/30/18 0245 148/88 - - - 100 % -             EXAM:     NEURO-alert, oriented x3, affect appropriate   HEENT-Head: Normocephalic, no lesions, without obvious abnormality. LUNGS-clear to auscultation bilaterally    COR-regular rate and rhythym     ABD- soft, TTP in RLQ. Bowel sounds normal. No masses,  no organomegaly     EXT-no edema    Skin - No rash     Data Review     Recent Labs      05/30/18   0152   WBC  12.2*   HGB  12.3   HCT  36.9   PLT  222     Recent Labs      05/30/18 0152   NA  138   K  4.1   CL  105   CO2  26   BUN  9   CREA  0.72   GLU  96   CA  9.0     Recent Labs      05/30/18   0152   SGOT  24   AP  49   TP  7.4   ALB  3.6   GLOB  3.8   LPSE  149     No results for input(s): INR, PTP, APTT in the last 72 hours. No lab exists for component: INREXT    Patient Active Problem List   Diagnosis Code    Headache, chronic daily R51    Crohn's disease ([facility] [address] K50.90    Leukocytosis D72.829    Abdominal pain R10.9    Nausea R11.0       Assessment and Plan:  Abdominal Pain: Possibly related to Crohns disease.   Her CT this admission shows inflammation and thickening of terminal illeum and significant proximal ileal dilatation.    - NPO   - IVF  - Continue IV steroids. Will review documentation from previous colonoscopy and amend note accordingly. Thanks for allowing me to participate in the care of this patient.   Signed By: Grayson Obregon NP     5/30/2018  10:40 AM

## 2018-05-30 NOTE — ED NOTES
Bedside and Verbal shift change report given to RN Fabian Edwards (oncoming nurse) by Olayinka New (offgoing nurse). Report included the following information SBAR, Kardex, ED Summary, MAR, Recent Results and Med Rec Status.

## 2018-05-30 NOTE — ED NOTES
Pt resting in room, pt alert and oriented x3, vs stable as noted, pt has no new complaints at this time. Call bell within reach, bed in low position and locked. Pt aware to call the RN on the call bell if needs anything. Pt verbalizes understanding.

## 2018-05-30 NOTE — ED PROVIDER NOTES
Patient is a 26 y.o. female presenting with abdominal pain. The history is provided by the patient. Abdominal Pain    This is a new problem. The current episode started 3 to 5 hours ago. The problem occurs constantly. The problem has not changed since onset. The pain is located in the RUQ and RLQ. The quality of the pain is sharp and aching. Associated symptoms include nausea. Pertinent negatives include no fever, no diarrhea, no hematochezia, no melena, no vomiting, no constipation, no dysuria, no hematuria, no headaches and no chest pain. Nothing worsens the pain. The pain is relieved by nothing. Past workup comments: CT scan last yue revealed Chrons Dz. She follows Dr Barrington Whitney and last saw him January. She takes [place] for this. .        Past Medical History:   Diagnosis Date    Fatigue     Headache        Past Surgical History:   Procedure Laterality Date    HX HEENT           Family History:   Problem Relation Age of Onset    Cancer Maternal Grandmother     Headache Maternal Grandmother     Cancer Paternal Grandmother     Cancer Other        Social History     Social History    Marital status:      Spouse name: N/A    Number of children: N/A    Years of education: N/A     Occupational History    Not on file. Social History Main Topics    Smoking status: Never Smoker    Smokeless tobacco: Never Used    Alcohol use Yes    Drug use: No    Sexual activity: Not on file     Other Topics Concern    Not on file     Social History Narrative         ALLERGIES: Review of patient's allergies indicates no known allergies. Review of Systems   Constitutional: Negative for appetite change, chills and fever. HENT: Negative for congestion and ear pain. Eyes: Negative for visual disturbance. Respiratory: Negative for cough, chest tightness and wheezing. Cardiovascular: Negative for chest pain. Gastrointestinal: Positive for abdominal pain and nausea.  Negative for blood in stool, constipation, diarrhea, hematochezia, melena and vomiting. Genitourinary: Negative for dysuria and hematuria. Skin: Negative for color change and rash. Neurological: Negative for dizziness, weakness and headaches. All other systems reviewed and are negative. Vitals:    05/30/18 0136 05/30/18 0150   BP: (!) 158/105    Pulse: 95    Resp: 18    Temp: 98.9 °F (37.2 °C)    SpO2: 100% 100%   Weight: 76.2 kg (168 lb)    Height: 5' 11\" (1.803 m)             Physical Exam   Constitutional: She is oriented to person, place, and time. She appears well-developed and well-nourished. HENT:   Head: Normocephalic and atraumatic. Right Ear: External ear normal.   Left Ear: External ear normal.   Mouth/Throat: Oropharynx is clear and moist.   Eyes: Conjunctivae are normal.   Neck: Neck supple. Cardiovascular: Normal rate, regular rhythm and normal heart sounds. Pulmonary/Chest: Effort normal and breath sounds normal.   Abdominal: Soft. Normal appearance and bowel sounds are normal. There is tenderness in the right upper quadrant and right lower quadrant. There is no rigidity, no rebound, no guarding, no CVA tenderness, no tenderness at McBurney's point and negative Aguilar's sign. Musculoskeletal: Normal range of motion. Neurological: She is alert and oriented to person, place, and time. Skin: Skin is warm and dry. Psychiatric: She has a normal mood and affect. Her behavior is normal.   Nursing note and vitals reviewed. MDM  Number of Diagnoses or Management Options  Abdominal pain, right lower quadrant:   Acute Crohn's disease with complication ([facility] [address]):   Leukocytosis, unspecified type:   Risk of Complications, Morbidity, and/or Mortality  General comments: 33yoF with abdominal pain; ddx includes Chrons flare, IBS, gastritis, cholelithiasis, other gallbladder pathologies, pancreatitis. Will check UPT, labs, treat with IV zofran, fluids, Dilaudid, pepcid.            ED Course   02:45: pt re-examined by Dr. Sotero Real and feels better but right side of abdomen . Will check CT abd.    03:28: Will admit patient given continued tenderness and small bowel dilation on CT and ileum inflammation. Pt updated and informed. Dr. Sotero Real has discussed pt with admitting physician who will see and admit pt.      Procedures

## 2018-05-30 NOTE — ED NOTES
TRANSFER - OUT REPORT:    Verbal report given to PARRISH Plascencia on Arianna Fournier  being transferred to medical unit for routine progression of care       Report consisted of patients Situation, Background, Assessment and   Recommendations(SBAR). Information from the following report(s) SBAR, Kardex, ED Summary, MAR, Recent Results and Med Rec Status was reviewed with the receiving nurse. Lines:   Peripheral IV 05/30/18 Left Antecubital (Active)   Site Assessment Clean, dry, & intact 5/30/2018  2:08 AM   Phlebitis Assessment 0 5/30/2018  2:08 AM   Infiltration Assessment 0 5/30/2018  2:08 AM   Dressing Status Clean, dry, & intact 5/30/2018  2:08 AM   Dressing Type Tape;Transparent 5/30/2018  2:08 AM        Opportunity for questions and clarification was provided.

## 2018-05-30 NOTE — PROGRESS NOTES
TRANSFER - OUT REPORT:    Verbal report given to John Pedroza RN (name) on Lidia Falk  being transferred to Geronimo Burkitt., RN (unit) for routine progression of care       Report consisted of patients Situation, Background, Assessment and   Recommendations(SBAR). Information from the following report(s) SBAR, Kardex, ED Summary, [facility] and Recent Results was reviewed with the receiving nurse. Lines:   Peripheral IV 05/30/18 Left Antecubital (Active)   Site Assessment Clean, dry, & intact 5/30/2018  2:08 AM   Phlebitis Assessment 0 5/30/2018  2:08 AM   Infiltration Assessment 0 5/30/2018  2:08 AM   Dressing Status Clean, dry, & intact 5/30/2018  2:08 AM   Dressing Type Tape;Transparent 5/30/2018  2:08 AM        Opportunity for questions and clarification was provided.       Patient transported with  ED HOLD

## 2018-05-30 NOTE — PROGRESS NOTES
[facility]: MED RECONCILIATION    Comments/Recommendations:   Patient is awake, alert, and knowledgeable about home medications   Pharmacist reviewed prescription refill history with [facility]. The patient reports she is not currently nor has she taken budesonide in the past. She had a prescribed filled a few months ago but did not actually start it as her symptoms subsided. The patient stopped topiramate about one month ago. She was taking this medication for migraines and has no issues since stopping this medication. Juice Plus is a new dietary supplement started in January of this year. Medications added:     · Juice Plus  · Vitamin D    Medications removed:    · Percocet  · Topiramate    Medications adjusted:    · Added dose and frequency to Humira    Allergies: Review of patient's allergies indicates no known allergies. Prior to Admission Medications:   Prior to Admission Medications   Prescriptions Last Dose Informant Patient Reported? Taking? OTHER,NON-FORMULARY, 2018 at Unknown time  Yes Yes   Sig: Juice plus gummy - Take four gummies twice daily   adalimumab (HUMIRA) 40 mg/0.8 mL injection 2018  Yes Yes   Si mg by SubCUTAneous route every fourteen (14) days. Indications: Crohn's Disease   cholecalciferol (VITAMIN D3) 1,000 unit tablet 2018 at Unknown time  Yes Yes   Sig: Take 1,000 Units by mouth daily. ondansetron (ZOFRAN ODT) 4 mg disintegrating tablet   No Yes   Sig: Take 1 Tab by mouth every eight (8) hours as needed for Nausea.       Facility-Administered Medications: None      Thank you,    Tad Grayson, PharmD, BCPS

## 2018-05-30 NOTE — IP AVS SNAPSHOT
[address] 
[phone] Patient: Dulce Maria Smith MRN: [id number] AGD:[birth year] About your hospitalization You were admitted on:  May 30, 2018 You last received care in the:  [facility]  MED SURG 2 You were discharged on:  June 1, 2018 Why you were hospitalized Your primary diagnosis was:  Not on File Your diagnoses also included:  Crohn's Disease (Hcc), Leukocytosis, Abdominal Pain, Nausea Follow-up Information Follow up With Details Comments Contact Info Tommy Robles MD   [address] [facility] [address] [place] [address] 
[phone] Discharge Orders None A check marky indicates which time of day the medication should be taken. My Medications START taking these medications Instructions Each Dose to Equal  
 Morning Noon Evening Bedtime  
 predniSONE 20 mg tablet Commonly known as:  Dianelys Lucero Your last dose was: Your next dose is: Take 40mg (4 tabs) daily x 1 week then 30mg (3 tabs) daily x 1 week then 20mg (2 tabs) daily x 1 week the 10mg (1 tab) daily x 1 week then stop CONTINUE taking these medications Instructions Each Dose to Equal  
 Morning Noon Evening Bedtime HUMIRA 40 mg/0.8 mL injection Generic drug:  adalimumab Your last dose was: Your next dose is:    
   
   
 40 mg by SubCUTAneous route every fourteen (14) days. Indications: Crohn's Disease 40 mg  
    
   
   
   
  
 ondansetron 4 mg disintegrating tablet Commonly known as:  ZOFRAN ODT Your last dose was: Your next dose is: Take 1 Tab by mouth every eight (8) hours as needed for Nausea. 4 mg OTHER(NON-FORMULARY) Your last dose was: Your next dose is: Juice plus gummy - Take four gummies twice daily VITAMIN D3 1,000 unit tablet Generic drug:  cholecalciferol Your last dose was: Your next dose is: Take 1,000 Units by mouth daily. 1000 Units Where to Get Your Medications Information on where to get these meds will be given to you by the nurse or doctor. ! Ask your nurse or doctor about these medications  
  predniSONE 20 mg tablet Discharge Instructions HOSPITALIST DISCHARGE INSTRUCTIONS 
NAME: Richa Berrios :  1985 MRN:  [id number] Date/Time:  2018 9:07 AM 
 
ADMIT DATE: 2018 DISCHARGE DATE: 2018 ADMITTING DIAGNOSIS: 
Crohn's flare DISCHARGE DIAGNOSIS: 
As above MEDICATIONS: 
  
· It is important that you take the medication exactly as they are prescribed. · Keep your medication in the bottles provided by the pharmacist and keep a list of the medication names, dosages, and times to be taken in your wallet. · Do not take other medications without consulting your doctor. Pain Management: per above medications [facility] to do at [facility] Recommended diet:  Clear liquids, advance as tolerated Recommended activity: Activity as tolerated If you experience any of the following symptoms then please call your primary care physician or return to the emergency room if you cannot get hold of your doctor: 
Fever, chills, nausea, vomiting, diarrhea, change in mentation, falling, bleeding, shortness of breath, chest pain Follow Up: 
Dr. Lauren Candelaria MD  you are to call and set up an appointment to see them in 2 weeks. Follow-up Information Follow up With Details Comments Contact Info Lauren Candelaria MD   [address] [place] [address] 
[phone] Information obtained by : 
I understand that if any problems occur once I am at home I am to contact my physician. I understand and acknowledge receipt of the instructions indicated above. Physician's or R.N.'s Signature                                                                  Date/Time Patient or Representative Signature                                                          Date/Time [employer] Announcement We are excited to announce that we are making your provider's discharge notes available to you in [employer]. You will see these notes when they are completed and signed by the physician that discharged you from your recent hospital stay. If you have any questions or concerns about any information you see in Geostellarhart, please call the Health Information Department where you were seen or reach out to your Primary Care Provider for more information about your plan of care. Introducing [facility]! Dear Ricco Huerta: Thank you for requesting a [employer] account. Our records indicate that you already have an active [employer] account. You can access your account anytime at [email or web address] Did you know that you can access your hospital and ER discharge instructions at any time in [employer]? You can also review all of your test results from your hospital stay or ER visit. Additional Information If you have questions, please visit the Frequently Asked Questions section of the [employer] website at [email or web address] Remember, [employer] is NOT to be used for urgent needs. For medical emergencies, dial 911. Now available from your iPhone and Android! Introducing Ish Good As a [facility] patient, I wanted to make you aware of our electronic visit tool called Ish Good. Upstream allows you to connect within minutes with a medical provider 24 hours a day, seven days a week via a mobile device or tablet or logging into a secure website from your computer. You can access vChatter from anywhere in the [place]. A virtual visit might be right for you when you have a simple condition and feel like you just dont want to get out of bed, or cant get away from work for an appointment, when your regular [facility] provider is not available (evenings, weekends or holidays), or when youre out of town and need minor care. Electronic visits cost only $49 and if the [employer] provider determines a prescription is needed to treat your condition, one can be electronically transmitted to a nearby pharmacy*. Please take a moment to enroll today if you have not already done so. The enrollment process is free and takes just a few minutes. To enroll, please download the Upstream maame to your tablet or phone, or visit [email or web address] to enroll on your computer. And, as an [address] [place] [address] patient with a Fl[employer] account, the results of your visits will be scanned into your electronic medical record and your primary care provider will be able to view the scanned results. We urge you to continue to see your regular [employer] provider for your ongoing medical care. And while your primary care provider may not be the one available when you seek a [employer] virtual visit, the peace of mind you get from getting a real diagnosis real time can be priceless. For more information on Si[employer]ionnataliefin, view our Frequently Asked Questions (FAQs) at [email or web address]. Sincerely, 
 
Deya Campoverde MD 
Chief Medical Officer Claudia8 Amy Schreiber *:  certain medications cannot be prescribed via vChatter Unresulted Labs-Please follow up with your PCP about these lab tests Order Current Status CULTURE, BLOOD Preliminary result MISC. LAB TEST Preliminary result Providers Seen During Your Hospitalization Provider Specialty Primary office phone Keon Christensen [facility] [phone] Julianna Grey MD Internal Medicine [phone] Wen Echavarria DO Internal Medicine [phone] Efrain Medina MD Internal Medicine [phone] Your Primary Care Physician (PCP) Primary Care Physician Office Phone Office Fax Lisa Anaya [phone] You are allergic to the following No active allergies Recent Documentation Height Weight BMI OB Status Smoking Status 1.803 m 76.2 kg 23.43 kg/m2 IUD Never Smoker Emergency Contacts Name Discharge Info Relation Home Work Mobile [address] CAREGIVER [3] Spouse [3] [phone] Patient Belongings The following personal items are in your possession at time of discharge: 
  Dental Appliances: None  Visual Aid: Glasses, With patient      Home Medications: None   Jewelry: Ring  Clothing: At bedside    Other Valuables: Cell Phone Please provide this summary of care documentation to your next provider. Signatures-by signing, you are acknowledging that this After Visit Summary has been reviewed with you and you have received a copy. Patient Signature:  ____________________________________________________________ Date:  ____________________________________________________________  
  
[facility] Provider Signature:  ____________________________________________________________ Date:  ____________________________________________________________

## 2018-05-30 NOTE — ED NOTES
Pt resting in room, vs stable as noted, pt has no new complaints at this time. Pt waiting for transport.

## 2018-05-30 NOTE — PROGRESS NOTES
5/30/2018  3:39 PM  Case Management note  Met with patient to discuss discharge planning. Patient lives in 2 story home with 4 steps to enter and 16 within. Confirmed charted demographics correct. Patient has no DME and never used HH. She uses [facility] in [address] [place] [address] for RX through [facility]. She follows Sung Gilbert for medical management. Her  can transport on discharge. Will continue to follow    Care Management Interventions  PCP Verified by CM: Yes  Mode of Transport at Discharge: Self  Transition of Care Consult (CM Consult): Discharge Planning  Physical Therapy Consult: No  Occupational Therapy Consult: No  Speech Therapy Consult: No  Current Support Network: Lives with Spouse  Confirm Follow Up Transport: Family  Plan discussed with Pt/Family/Caregiver: Yes  Discharge Location  Discharge Placement: Home with family assistance    Reason for Admission:   Abdominal pain                   RRAT Score:          3           Plan for utilizing home health:      None at this time                    Likelihood of Readmission:  Low/green                         Transition of Care Plan:             Home with family assitance.     Benjamin Kulkarni, [address]

## 2018-05-30 NOTE — H&P
[facility]    Hospitalist Admission Note      NAME:  Vinny Webb   :   1985   MRN:  [id number]     PCP:  Dorothy Gibbs MD     Date/Time:  2018 4:03 AM          Subjective:     CHIEF COMPLAINT: Abd pain, nausea     HISTORY OF PRESENT ILLNESS:     Ms. Devin Quintanilla is a 26 y.o.  female with a hx of Crohn's disease followed by Dr. Ramona Saleem on humira and budesonide who presented to the Emergency Department complaining of sudden onset, sharp RLQ/LLQ abdominal pain. No obvious precipitating event, no fevers, chills, vomiting or diarrhea. Nothing makes better or worse. Has been taking meds for crohn's. In ED, noted to have terminal ileum inflammation and proximal SB dilation. We will admit for further management. Past Medical History:   Diagnosis Date    Fatigue     Headache         Past Surgical History:   Procedure Laterality Date    HX HEENT         Social History   Substance Use Topics    Smoking status: Never Smoker    Smokeless tobacco: Never Used    Alcohol use Yes        Family History   Problem Relation Age of Onset    Cancer Maternal Grandmother     Headache Maternal Grandmother     Cancer Paternal Grandmother     Cancer Other       Family hx cannot be fully assessed, due to the admitting conditions    No Known Allergies     Prior to Admission medications    Medication Sig Start Date End Date Taking? Authorizing Provider   oxyCODONE-acetaminophen (PERCOCET) 5-325 mg per tablet Take 1 Tab by mouth every four (4) hours as needed for Pain. Max Daily Amount: 6 Tabs. 17   Hurshel Sample, NP   ondansetron (ZOFRAN ODT) 4 mg disintegrating tablet Take 1 Tab by mouth every eight (8) hours as needed for Nausea. 17   Hurshel Sample, NP   topiramate (TOPAMAX) 100 mg tablet Take 1 Tab by mouth nightly.  16   Evelin Freeman MD       Review of Systems:  (bold if positive, if negative)    Gen:  Eyes:  ENT:  CVS:  Pulm:  GI:  Abdominal pain, nausea  : MS:  Skin:  Psych:  Endo:    Hem:  Renal:    Neuro:        Objective:      VITALS:    Vital signs reviewed; most recent are:    Visit Vitals    /88    Pulse 95    Temp 98.9 °F (37.2 °C)    Resp 18    Ht 5' 11\" (1.803 m)    Wt 76.2 kg (168 lb)    SpO2 100%    BMI 23.43 kg/m2     SpO2 Readings from Last 6 Encounters:   05/30/18 100%   07/14/17 100%   02/17/16 98%        No intake or output data in the 24 hours ending 05/30/18 0403     Exam:     Physical Exam:    Gen:  Well-developed, well-nourished, in no acute distress  HEENT:  Pink conjunctivae, PERRL, hearing intact to voice, moist mucous membranes  Neck:  Supple, without masses, thyroid non-tender  Resp:  No accessory muscle use, clear breath sounds without wheezes rales or rhonchi  Card:  No murmurs, normal S1, S2 without thrills, bruits or peripheral edema  Abd:  Soft, ttp in lower quads, non-distended, normoactive bowel sounds are present, no palpable organomegaly and no detectable hernias  Lymph:  No cervical or inguinal adenopathy  Musc:  No cyanosis or clubbing  Skin:  No rashes or ulcers, skin turgor is good  Neuro:  Cranial nerves are grossly intact, no focal motor weakness, follows commands appropriately  Psych:  Good insight, oriented to person, place and time, alert     Labs:    Recent Labs      05/30/18 0152   WBC  12.2*   HGB  12.3   HCT  36.9   PLT  222     Recent Labs      05/30/18 0152   NA  138   K  4.1   CL  105   CO2  26   GLU  96   BUN  9   CREA  0.72   CA  9.0   ALB  3.6   TBILI  0.4   SGOT  24   ALT  15     No results found for: GLUCPOC  No results for input(s): PH, PCO2, PO2, HCO3, FIO2 in the last 72 hours. No results for input(s): INR in the last 72 hours.     No lab exists for component: INREXT  All Micro Results     Procedure Component Value Units Date/Time    URINE CULTURE HOLD SAMPLE [[id number]] Collected:  05/30/18 0152    Order Status:  Completed Specimen:  Urine from Serum Updated:  05/30/18 0216     Urine culture hold         URINE ON HOLD IN MICROBIOLOGY DEPT FOR 3 DAYS. IF UNPRESERVED URINE IS SUBMITTED, IT CANNOT BE USED FOR ADDITIONAL TESTING AFTER 24 HRS, RECOLLECTION WILL BE REQUIRED. CT A/P:    IMPRESSION:  Thickening of the terminal ileum compatible with the history of Crohn's disease,  with significant proximal ileal dilatation, new since the prior study.       I have reviewed previous records       Assessment and Plan: Active Problems:  Crohn's disease / Abdominal pain / Nausea. Followed by Brenda Gentile, failed steroid and 5-ASA therapy, started on Humira in Jan with good response. Now with T.I. inflammation and proximal SB dilation. Admit to medicine. GI consult. IV steroids. NPO with sips of clears. Pain control. Anti-emetics. IVFs. Hold on abx as no diarrhea or evidence of fistualizing disease    Leukocytosis (5/30/2018). Unclear etiology. Likely worsen with steroids. No obvious infection. No diarrhea, fever/chills.  Monitor      Telemetry reviewed:   normal sinus rhythm    Risk of deterioration: medium      Total time spent with patient: 48 [address] [facility] discussed with: Patient, Family and Nursing Staff    Discussed:  Care Plan and D/C Planning       ___________________________________________________    Attending Physician: Darlene Love DO

## 2018-05-30 NOTE — ED NOTES
Introduced myself to patient as primary nurse. vs stable as noted, pt has no current complaints at this time. pt alert and oriented x3. Nathlaie Peaks Pt verbalizes understanding. Pt informed to notify nurse if it is unacceptable to talk about their patient care in front of visitors at the bedside. Pt reports understanding. Bed in low position and locked, call bell within reach. Pt aware to call the call bell if needs anything. Pt ambulates to restroom with steady gait.

## 2018-05-30 NOTE — ED NOTES
Pt transferred to ED 19. Pt ambulated to room with no difficulty. Receiving RN has no further questions at this time regarding pt care. Pt informed of current plan of care. VS cycling q1hr.

## 2018-05-30 NOTE — PROGRESS NOTES
TRANSFER - IN REPORT:    Verbal report received from [address] [place] [address] Drive (name) on Patrick Floss  being received from ED (unit) for routine progression of care      Report consisted of patients Situation, Background, Assessment and   Recommendations(SBAR). Information from the following report(s) SBAR, Kardex, ED Summary, Intake/Output, MAR, Accordion and Recent Results was reviewed with the receiving nurse. Opportunity for questions and clarification was provided. Assessment completed upon patients arrival to unit and care assumed.

## 2018-05-30 NOTE — PROGRESS NOTES
Bedside and Verbal shift change report given to Amanda (oncoming nurse) by Denise Pérez (offgoing nurse). Report included the following information SBAR, Kardex, Intake/Output, MAR, Accordion and Recent Results.

## 2018-05-30 NOTE — PROGRESS NOTES
[facility] [address]  [address] [facility], [address] [place] [address]  [phone]      Medical Progress Note      NAME:         Kassy Gonzalez   :        1985  MRM:        [id number]    Date:          2018      Subjective: Patient has been seen and examined as a follow up for Crohn's flare. Chart, labs, diagnostics reviewed. She feels much better. No diarrhea. Generalized aching abdominal discomfort. No fever or chills. Objective:    Vital Signs:    Visit Vitals    /79    Pulse 72    Temp 97.9 °F (36.6 °C)    Resp 18    Ht 5' 11\" (1.803 m)    Wt 76.2 kg (168 lb)  Comment: PT REPORTS WEIGHING ON HOME SCALE YESTERDAY    SpO2 97%    BMI 23.43 kg/m2      No intake or output data in the 24 hours ending 18 0979     Physical Examination:    General:   Well looking and nourished patient in no acute distress  Eyes:   pink conjunctivae, PERRLA with no discharge. ENT:   no ottorrhea or rhinorrhea with moist mucous membranes  Neck: no masses, thyroid non-tender and trachea central.  Pulm: clear breath sounds without crackles or wheezes  Card:  no JVD or murmurs, has regular and normal S1, S2 without thrills, bruits or peripheral edema  Abd:  Soft, minimal discomfort, non-distended, normoactive bowel sounds with no palpable organomegaly  Musc:  No cyanosis, clubbing, atrophy or deformities. Skin:  No rashes, bruising or ulcers. Neuro: Awake and alert.  Generally a non focal exam. Follows commands appropriately  Psych:  Has a good insight and is oriented x 3    Current Facility-Administered Medications   Medication Dose Route Frequency    sodium chloride (NS) flush 5-10 mL  5-10 mL IntraVENous Q8H    sodium chloride (NS) flush 5-10 mL  5-10 mL IntraVENous PRN    0.9% sodium chloride infusion  100 mL/hr IntraVENous CONTINUOUS    sodium chloride (NS) flush 5-10 mL  5-10 mL IntraVENous Q8H    sodium chloride (NS) flush 5-10 mL  5-10 mL IntraVENous PRN    ondansetron (ZOFRAN) injection 4 mg  4 mg IntraVENous Q4H PRN    acetaminophen (TYLENOL) tablet 650 mg  650 mg Oral Q4H PRN    HYDROcodone-acetaminophen (NORCO) 5-325 mg per tablet 1 Tab  1 Tab Oral Q4H PRN    morphine injection 2 mg  2 mg IntraVENous Q4H PRN    naloxone (NARCAN) injection 0.4 mg  0.4 mg IntraVENous PRN    enoxaparin (LOVENOX) injection 40 mg  40 mg SubCUTAneous Q24H    methylPREDNISolone (PF) (SOLU-MEDROL) injection 40 mg  40 mg IntraVENous Q6H        Laboratory data and review:    Recent Labs      05/30/18   0152   WBC  12.2*   HGB  12.3   HCT  36.9   PLT  222     Recent Labs      05/30/18 0152   NA  138   K  4.1   CL  105   CO2  26   GLU  96   BUN  9   CREA  0.72   CA  9.0   ALB  3.6   SGOT  24   ALT  15     No components found for: GLPOC    Assessment and Plan:    Crohn's disease / Abdominal pain / Nausea POA: Follow Dr Blossom Beaver, failed steroid and 5-ASA therapy, started on Humira in Jan with good response. CT abdomen showed thickening of the terminal ileum compatible with the history of Crohn's disease, with significant proximal ileal dilatation, new since the prior study. Appreciate GI consult. Continue IV solumedrol. Pain control. IVFs and follow clinically. Leukocytosis (5/30/2018): Unclear etiology. Likely worsen with steroids. No obvious infection.  Monitor for now.      Total time spent for the patient's care: [address] discussed with: Patient, Family and Nursing Staff    Discussed:  Care Plan    Prophylaxis:  Lovenox    Anticipated Disposition:  Home w/Family           ___________________________________________________    Attending Physician:   Jose Anand MD

## 2018-05-31 LAB
CRP SERPL-MCNC: <0.29 MG/DL
ERYTHROCYTE [SEDIMENTATION RATE] IN BLOOD: 15 MM/HR (ref 0–20)

## 2018-05-31 PROCEDURE — 36415 COLL VENOUS BLD VENIPUNCTURE: CPT | Performed by: NURSE PRACTITIONER

## 2018-05-31 PROCEDURE — 74011250637 HC RX REV CODE- 250/637: Performed by: INTERNAL MEDICINE

## 2018-05-31 PROCEDURE — 85652 RBC SED RATE AUTOMATED: CPT | Performed by: NURSE PRACTITIONER

## 2018-05-31 PROCEDURE — 65270000029 HC RM PRIVATE

## 2018-05-31 PROCEDURE — 86140 C-REACTIVE PROTEIN: CPT | Performed by: NURSE PRACTITIONER

## 2018-05-31 PROCEDURE — 74011250636 HC RX REV CODE- 250/636: Performed by: INTERNAL MEDICINE

## 2018-05-31 RX ADMIN — Medication 10 ML: at 21:00

## 2018-05-31 RX ADMIN — Medication 10 ML: at 04:55

## 2018-05-31 RX ADMIN — METHYLPREDNISOLONE SODIUM SUCCINATE 40 MG: 40 INJECTION, POWDER, FOR SOLUTION INTRAMUSCULAR; INTRAVENOUS at 10:19

## 2018-05-31 RX ADMIN — ENOXAPARIN SODIUM 40 MG: 100 INJECTION SUBCUTANEOUS at 10:19

## 2018-05-31 RX ADMIN — METHYLPREDNISOLONE SODIUM SUCCINATE 40 MG: 40 INJECTION, POWDER, FOR SOLUTION INTRAMUSCULAR; INTRAVENOUS at 21:00

## 2018-05-31 RX ADMIN — METHYLPREDNISOLONE SODIUM SUCCINATE 40 MG: 40 INJECTION, POWDER, FOR SOLUTION INTRAMUSCULAR; INTRAVENOUS at 17:44

## 2018-05-31 RX ADMIN — SODIUM CHLORIDE 100 ML/HR: 900 INJECTION, SOLUTION INTRAVENOUS at 03:14

## 2018-05-31 RX ADMIN — METHYLPREDNISOLONE SODIUM SUCCINATE 40 MG: 40 INJECTION, POWDER, FOR SOLUTION INTRAMUSCULAR; INTRAVENOUS at 04:55

## 2018-05-31 RX ADMIN — HYDROCODONE BITARTRATE AND ACETAMINOPHEN 1 TABLET: 5; 325 TABLET ORAL at 21:00

## 2018-05-31 RX ADMIN — Medication 10 ML: at 14:00

## 2018-05-31 NOTE — PROGRESS NOTES
Bedside and Verbal shift change report given to Sofia Hansen (oncoming nurse) by Monique Peguero (offgoing nurse). Report included the following information SBAR, Kardex, ED Summary, Intake/Output, MAR, Accordion and Recent Results.

## 2018-05-31 NOTE — PROGRESS NOTES
[address] NP  [phone]           GI PROGRESS NOTE        NAME: Uri Salomon   :  1985   MRN:  [id number]       Subjective:   Pt reports rlq abdominal pain has improved. No nausea and vomiting overnight. She is ready to try CLD today. She is asking what can be done to stop inflammation in her bowel. Objective:         VITALS:   Last 24hrs VS reviewed since prior progress note. Most recent are:  Visit Vitals    /73 (BP 1 Location: Right arm, BP Patient Position: At rest)    Pulse 78    Temp 98.2 °F (36.8 °C)    Resp 17    Ht 5' 11\" (1.803 m)    Wt 76.2 kg (168 lb)    SpO2 97%    BMI 23.43 kg/m2     No intake or output data in the 24 hours ending 18    PHYSICAL EXAM:  General: Alert, in no acute distress    HEENT: Anicteric sclerae. Lungs:            CTA Bilaterally. Heart:  Regular  rhythm,    Abdomen: Soft, Non distended, tender to palpation in RLQ (+)Bowel sounds, no HSM  Extremities: No c/c/e  Neurologic:  CN 2-12 gi, Alert and oriented X 3. No acute neurological distress   Psych:   Good insight. Not anxious nor agitated. Lab Data Reviewed:   Recent Labs      18   015   WBC  12.2*   HGB  12.3   HCT  36.9   PLT  222     Recent Labs      18   015   NA  138   K  4.1   CL  105   CO2  26   BUN  9   CREA  0.72   GLU  96   CA  9.0     Recent Labs      18   SGOT  24   AP  49   TP  7.4   ALB  3.6   GLOB  3.8   LPSE  149       ________________________________________________________________________  Patient Active Problem List   Diagnosis Code    Headache, chronic daily R51    Crohn's disease ([facility] [address] K50.90    Leukocytosis D72.829    Abdominal pain R10.9    Nausea R11.0         Assessment and Plan:  Abdominal Pain /  Crohn's Flare:  Improving.    - Continue IVF  - Continue IV steroids.  - Advanced to Clear Liquid Diet today. - Blood cultures sent yesterday - will follow the results.   - Continue Supportive Care       Signed By: Mj Joyner NP     5/31/2018  9:27 AM

## 2018-05-31 NOTE — PROGRESS NOTES
[facility] [address]  [address] [facility], [address] [place] [address]  [phone]      Medical Progress Note      NAME:         Roann Holstein   :        1985  MRM:        [id number]    Date:          2018      Subjective: Patient has been seen and examined as a follow up for Crohn's flare. Chart, labs, diagnostics reviewed. She feels much better. Denies any diarrhea or nausea. No fever or chills. Objective:    Vital Signs:    Visit Vitals    /73 (BP 1 Location: Right arm, BP Patient Position: At rest)    Pulse 78    Temp 98.2 °F (36.8 °C)    Resp 17    Ht 5' 11\" (1.803 m)    Wt 76.2 kg (168 lb)  Comment: PT REPORTS WEIGHING ON HOME SCALE YESTERDAY    SpO2 97%    BMI 23.43 kg/m2      No intake or output data in the 24 hours ending 18 0838     Physical Examination:    General:   Well looking and nourished patient in no acute distress  Eyes:   pink conjunctivae, PERRLA with no discharge. ENT:   no ottorrhea or rhinorrhea with moist mucous membranes  Pulm: clear breath sounds without crackles or wheezes  Card:  no JVD or murmurs, has regular and normal S1, S2 without thrills, bruits or peripheral edema  Abd:  Soft, minimal discomfort, non-distended, normoactive bowel sounds   Musc:  No cyanosis, clubbing, atrophy or deformities. Skin:  No rashes, bruising or ulcers. Neuro: Awake and alert.  Generally a non focal exam.   Psych:  Has a good insight and is oriented x 3    Current Facility-Administered Medications   Medication Dose Route Frequency    sodium chloride (NS) flush 5-10 mL  5-10 mL IntraVENous Q8H    sodium chloride (NS) flush 5-10 mL  5-10 mL IntraVENous PRN    0.9% sodium chloride infusion  100 mL/hr IntraVENous CONTINUOUS    sodium chloride (NS) flush 5-10 mL  5-10 mL IntraVENous Q8H    sodium chloride (NS) flush 5-10 mL  5-10 mL IntraVENous PRN    ondansetron (ZOFRAN) injection 4 mg  4 mg IntraVENous Q4H PRN    acetaminophen (TYLENOL) tablet 650 mg  650 mg Oral Q4H PRN    HYDROcodone-acetaminophen (NORCO) 5-325 mg per tablet 1 Tab  1 Tab Oral Q4H PRN    morphine injection 2 mg  2 mg IntraVENous Q4H PRN    naloxone (NARCAN) injection 0.4 mg  0.4 mg IntraVENous PRN    enoxaparin (LOVENOX) injection 40 mg  40 mg SubCUTAneous Q24H    methylPREDNISolone (PF) (SOLU-MEDROL) injection 40 mg  40 mg IntraVENous Q6H    zolpidem (AMBIEN) tablet 5 mg  5 mg Oral QHS PRN        Laboratory data and review:    Recent Labs      05/30/18   0152   WBC  12.2*   HGB  12.3   HCT  36.9   PLT  222     Recent Labs      05/30/18   0152   NA  138   K  4.1   CL  105   CO2  26   GLU  96   BUN  9   CREA  0.72   CA  9.0   ALB  3.6   SGOT  24   ALT  15     No components found for: GLPOC    Assessment and Plan:    Crohn's disease / Abdominal pain / Nausea POA: Follow Dr Margarita King, failed steroid and 5-ASA therapy, started on Humira in Jan with good response. CT abdomen showed thickening of the terminal ileum compatible with the history of Crohn's disease, with significant proximal ileal dilatation, new since the prior study. Appreciate GI consult. Continue IV solumedrol. Pain control. IVFs and diet as per GI. Leukocytosis (5/30/2018): Unclear etiology. Likely worsen with steroids. No obvious infection.  She remains stable.       Total time spent for the patient's care: [address] discussed with: Patient, Family and Nursing Staff    Discussed:  Care Plan    Prophylaxis:  Lovenox    Anticipated Disposition:  Home w/Family           ___________________________________________________    Attending Physician:   Krystian Fisher MD

## 2018-05-31 NOTE — PROGRESS NOTES
5/31/2018  12:05 PM  Case Management Note    Patient starting on clear liquids, awaiting test results. No CM needs identified. Will continue to follow.   Jose Ortiz, [address]

## 2018-06-01 VITALS
HEIGHT: 71 IN | HEART RATE: 58 BPM | TEMPERATURE: 98.1 F | WEIGHT: 168 LBS | DIASTOLIC BLOOD PRESSURE: 87 MMHG | BODY MASS INDEX: 23.52 KG/M2 | SYSTOLIC BLOOD PRESSURE: 130 MMHG | RESPIRATION RATE: 16 BRPM | OXYGEN SATURATION: 96 %

## 2018-06-01 PROCEDURE — 74011250637 HC RX REV CODE- 250/637: Performed by: INTERNAL MEDICINE

## 2018-06-01 PROCEDURE — 74011250636 HC RX REV CODE- 250/636: Performed by: INTERNAL MEDICINE

## 2018-06-01 PROCEDURE — 74011636637 HC RX REV CODE- 636/637: Performed by: INTERNAL MEDICINE

## 2018-06-01 RX ORDER — DOCUSATE SODIUM 100 MG/1
100 CAPSULE, LIQUID FILLED ORAL DAILY
Status: DISCONTINUED | OUTPATIENT
Start: 2018-06-01 | End: 2018-06-01 | Stop reason: HOSPADM

## 2018-06-01 RX ORDER — PREDNISONE 20 MG/1
TABLET ORAL
Qty: 70 TAB | Refills: 0 | Status: ON HOLD | OUTPATIENT
Start: 2018-06-01 | End: 2019-08-12

## 2018-06-01 RX ORDER — PREDNISONE 20 MG/1
40 TABLET ORAL
Status: DISCONTINUED | OUTPATIENT
Start: 2018-06-01 | End: 2018-06-01 | Stop reason: HOSPADM

## 2018-06-01 RX ADMIN — Medication 10 ML: at 05:54

## 2018-06-01 RX ADMIN — ENOXAPARIN SODIUM 40 MG: 100 INJECTION SUBCUTANEOUS at 09:34

## 2018-06-01 RX ADMIN — PREDNISONE 40 MG: 20 TABLET ORAL at 09:34

## 2018-06-01 RX ADMIN — METHYLPREDNISOLONE SODIUM SUCCINATE 40 MG: 40 INJECTION, POWDER, FOR SOLUTION INTRAMUSCULAR; INTRAVENOUS at 05:54

## 2018-06-01 RX ADMIN — DOCUSATE SODIUM 100 MG: 100 CAPSULE, LIQUID FILLED ORAL at 09:34

## 2018-06-01 NOTE — PROGRESS NOTES
Pharmacist Discharge Medication Reconciliation    Discharge Provider:  Dr. Osborne Smaller       Discharge Medications:      My Medications        START taking these medications         Instructions Each Dose to Equal   Morning Noon Evening Bedtime      predniSONE 20 mg tablet   Commonly known as:  Kassandra Murdocki       Your last dose was: Your next dose is: Take 40mg (4 tabs) daily x 1 week then 30mg (3 tabs) daily x 1 week then 20mg (2 tabs) daily x 1 week the 10mg (1 tab) daily x 1 week then stop                               CONTINUE taking these medications         Instructions Each Dose to Equal   Morning Noon Evening Bedtime      HUMIRA 40 mg/0.8 mL injection   Generic drug:  adalimumab       Your last dose was: Your next dose is:              40 mg by SubCUTAneous route every fourteen (14) days. Indications: Crohn's Disease    40 mg                        ondansetron 4 mg disintegrating tablet   Commonly known as:  ZOFRAN ODT       Your last dose was: Your next dose is: Take 1 Tab by mouth every eight (8) hours as needed for Nausea. 4 mg                        OTHER(NON-FORMULARY)       Your last dose was: Your next dose is:              Juice plus gummy - Take four gummies twice daily                         VITAMIN D3 1,000 unit tablet   Generic drug:  cholecalciferol       Your last dose was: Your next dose is: Take 1,000 Units by mouth daily. 1000 Units                                 Where to Get Your Medications        Information on where to get these meds will be given to you by the nurse or doctor. !  Ask your nurse or doctor about these medications     predniSONE 20 mg tablet                  The patient's chart, MAR, and AVS were reviewed by   Cuba Jacobsen PharmD  Contact: [phone]

## 2018-06-01 NOTE — DISCHARGE SUMMARY
Physician Discharge Summary     Patient ID:  Christina Bah  [id number]  3 y.o.  1985    Admit date: 5/30/2018    Discharge date and time: 6/1/2018    Admission Diagnoses: Abdominal pain    Discharge Diagnoses/Hospital Course   Ms. Leopoldo Else is a pleasant 27 yo female with PMH of Crohn's disease, on Humira admitted for Crohn's flare. She clinically improved with IV steroids. She was seen by GI and antibodies to Humira were checked to determine if her regimen needs to be changed     PCP: Jun Hogue MD     Consults: GI     Discharge Exam:  Visit Vitals    /87 (BP 1 Location: Right arm, BP Patient Position: At rest)    Pulse (!) 58    Temp 98.1 °F (36.7 °C)    Resp 16    Ht 5' 11\" (1.803 m)    Wt 76.2 kg (168 lb)    SpO2 96%    BMI 23.43 kg/m2     General:   Well looking and nourished patient in no acute distress  Eyes:   pink conjunctivae, PERRLA with no discharge. ENT:   no ottorrhea or rhinorrhea with moist mucous membranes  Pulm: clear breath sounds without crackles or wheezes  Card:  no JVD or murmurs, has regular and normal S1, S2 without thrills, bruits or peripheral edema  Abd:  Soft, minimal discomfort, non-distended, normoactive bowel sounds   Musc:  No cyanosis, clubbing, atrophy or deformities. Skin:  No rashes, bruising or ulcers. Neuro: Awake and alert. Generally a non focal exam.   Psych:  Has a good insight and is oriented x 3    Disposition: home    Patient Instructions:   Current Discharge Medication List      START taking these medications    Details   predniSONE (DELTASONE) 20 mg tablet Take 40mg (4 tabs) daily x 1 week then 30mg (3 tabs) daily x 1 week then 20mg (2 tabs) daily x 1 week the 10mg (1 tab) daily x 1 week then stop  Qty: 70 Tab, Refills: 0         CONTINUE these medications which have NOT CHANGED    Details   adalimumab (HUMIRA) 40 mg/0.8 mL injection 40 mg by SubCUTAneous route every fourteen (14) days.  Indications: Crohn's Disease      cholecalciferol (VITAMIN D3) 1,000 unit tablet Take 1,000 Units by mouth daily. OTHER,NON-FORMULARY, Juice plus gummy - Take four gummies twice daily      ondansetron (ZOFRAN ODT) 4 mg disintegrating tablet Take 1 Tab by mouth every eight (8) hours as needed for Nausea.   Qty: 20 Tab, Refills: 0           Activity: as tolerated   Diet: as per rec's   Follow-up with Radha Grayson MD as needed  Follow-up Information     Follow up With Details Comments [address] , [place] [address]  [phone]          Please follow-up with GI as advised     Approximate time spent in patient care on day of discharge: 34 minutes     Signed:  Efrain Medina MD  6/1/2018  10:13 AM

## 2018-06-01 NOTE — PROGRESS NOTES
Bedside and Verbal shift change report given to Delmi Vinson RN (oncoming nurse) by Felipe Andersen RN (offgoing nurse). Report included the following information SBAR and Kardex.

## 2018-06-01 NOTE — PROGRESS NOTES
6/1/2018  11:22 AM  Case Management Note    Patient has no CM needs, ate dinner with no issues. Discharge orders on chart.   Glenna Sierra, [address]

## 2018-06-01 NOTE — PROGRESS NOTES
Problem: Falls - Risk of  Goal: *Absence of Falls  Document Timothy Fall Risk and appropriate interventions in the flowsheet.    Outcome: Progressing Towards Goal  Fall Risk Interventions:            Medication Interventions: Patient to call before getting OOB

## 2018-06-01 NOTE — PROGRESS NOTES
Patient stuck with butterfly needle three times for blood. Only small amount accessed in yellow top. Lab called to see if blood gotten would be adequate. Per lab, instructed to send blood that was able to be gotten. 1148: Patient given discharge instructions and prescriptions, patient verbalizes understanding. PIV removed. 1210: Per lab, all blood work needed collected. Patient and  made aware. Patient given time to ask questions. Patient out of hospital room in wheelchair with volunteer. Per lab personnel, the Humira level ordered today was ordered and started on 5/31. Per lab personnel, order needs to be canceled. 1214: Mj Joyner with GI paged to be made aware of need to cancel lab order. 1219: Spoke with Tomeka Baptiste over the phone, made him aware that the lab he order was placed ordered and begun on 5/31. Per Mat Boots, order placed on 6/1 needs to be canceled.

## 2018-06-01 NOTE — PROGRESS NOTES
[address] NP  [phone]           GI PROGRESS NOTE        NAME: Dulce Maria Smith   :  1985   MRN:  [id number]       Subjective:   Reports still somewhat tender in Right lower quadrant. She was able to eat part of a grilled cheese sandwich last night. Objective:         VITALS:   Last 24hrs VS reviewed since prior progress note. Most recent are:  Visit Vitals    /87 (BP 1 Location: Right arm, BP Patient Position: At rest)    Pulse (!) 58    Temp 98.1 °F (36.7 °C)    Resp 16    Ht 5' 11\" (1.803 m)    Wt 76.2 kg (168 lb)    SpO2 96%    BMI 23.43 kg/m2       Intake/Output Summary (Last 24 hours) at 18 1006  Last data filed at 18 0332   Gross per 24 hour   Intake              240 ml   Output                0 ml   Net              240 ml       PHYSICAL EXAM:  General: Alert, in no acute distress    HEENT: Anicteric sclerae. Lungs:            CTA Bilaterally. Heart:  Regular  rhythm,    Abdomen: Soft, Non distended,tender in RLQ.  (+)Bowel sounds, no HSM  Extremities: No c/c/e  Neurologic:  CN 2-12 gi, Alert and oriented X 3. No acute neurological distress   Psych:   Good insight. Not anxious nor agitated. Lab Data Reviewed:   Recent Labs      18   0152   WBC  12.2*   HGB  12.3   HCT  36.9   PLT  222     Recent Labs      18   0152   NA  138   K  4.1   CL  105   CO2  26   BUN  9   CREA  0.72   GLU  96   CA  9.0     Recent Labs      18   0152   SGOT  24   AP  49   TP  7.4   ALB  3.6   GLOB  3.8   LPSE  149       ________________________________________________________________________  Patient Active Problem List   Diagnosis Code    Headache, chronic daily R51    Crohn's disease ([facility] [address] K50.90    Leukocytosis D72.829    Abdominal pain R10.9    Nausea R11.0         Assessment and Plan:  RLQ abdominal pain - Improving. Able to tolerate regular food last night.    - Start Prednisone taper.   40mg daily for one week, then decrease by 10mg every week until finished. - Humira levels and antibody levels are pending. Will follow results. We will arrange for follow up for her in our office next week. OK to discharg home from GI perspective.          Signed By: Beena Pandya NP     6/1/2018  10:06 AM

## 2018-06-01 NOTE — DISCHARGE INSTRUCTIONS
HOSPITALIST DISCHARGE INSTRUCTIONS  NAME: Laney Oro   :  1985   MRN:  [id number]     Date/Time:  2018 9:07 AM    ADMIT DATE: 2018     DISCHARGE DATE: 2018     ADMITTING DIAGNOSIS:  Crohn's flare    DISCHARGE DIAGNOSIS:  As above     MEDICATIONS:     · It is important that you take the medication exactly as they are prescribed. · Keep your medication in the bottles provided by the pharmacist and keep a list of the medication names, dosages, and times to be taken in your wallet. · Do not take other medications without consulting your doctor. Pain Management: per above medications    What to do at Home    Recommended diet:  Clear liquids, advance as tolerated    Recommended activity: Activity as tolerated    If you experience any of the following symptoms then please call your primary care physician or return to the emergency room if you cannot get hold of your doctor:  Fever, chills, nausea, vomiting, diarrhea, change in mentation, falling, bleeding, shortness of breath, chest pain     Follow Up:  Dr. Frank Leyva MD  you are to call and set up an appointment to see them in 2 weeks. Follow-up Information     Follow up With Details Comments [address] [facility] [address] [place] [address]  [phone]            Information obtained by :  I understand that if any problems occur once I am at home I am to contact my physician. I understand and acknowledge receipt of the instructions indicated above.                                                                                                                                            Physician's or R.N.'s Signature                                                                  Date/Time                                                                                                                                              Patient or Representative Signature Date/Time

## 2018-06-05 LAB
BACTERIA SPEC CULT: NORMAL
SERVICE CMNT-IMP: NORMAL

## 2018-06-08 LAB
Lab: NORMAL
REFERENCE LAB,REFLB: NORMAL
TEST DESCRIPTION:,ATST: NORMAL

## 2019-08-12 ENCOUNTER — APPOINTMENT (OUTPATIENT)
Dept: CT IMAGING | Age: 34
End: 2019-08-12
Attending: EMERGENCY MEDICINE
Payer: COMMERCIAL

## 2019-08-12 ENCOUNTER — HOSPITAL ENCOUNTER (OUTPATIENT)
Age: 34
Setting detail: OBSERVATION
Discharge: HOME | End: 2019-08-13
Attending: EMERGENCY MEDICINE | Admitting: INTERNAL MEDICINE
Payer: COMMERCIAL

## 2019-08-12 DIAGNOSIS — R11.0 NAUSEA WITHOUT VOMITING: ICD-10-CM

## 2019-08-12 DIAGNOSIS — K50.90 EXACERBATION OF CROHN'S DISEASE WITHOUT COMPLICATION (HCC): ICD-10-CM

## 2019-08-12 DIAGNOSIS — R10.31 RLQ ABDOMINAL PAIN: Primary | ICD-10-CM

## 2019-08-12 DIAGNOSIS — K50.00 CROHN'S DISEASE INVOLVING TERMINAL ILEUM (HCC): ICD-10-CM

## 2019-08-12 LAB
ALBUMIN SERPL-MCNC: 3.6 G/DL (ref 3.5–5)
ALBUMIN/GLOB SERPL: 0.8 {RATIO} (ref 1.1–2.2)
ALP SERPL-CCNC: 65 U/L (ref 45–117)
ALT SERPL-CCNC: 15 U/L (ref 12–78)
ANION GAP SERPL CALC-SCNC: 4 MMOL/L (ref 5–15)
APPEARANCE UR: CLEAR
AST SERPL-CCNC: 12 U/L (ref 15–37)
BACTERIA URNS QL MICRO: NEGATIVE /HPF
BASOPHILS # BLD: 0.1 K/UL (ref 0–0.1)
BASOPHILS NFR BLD: 1 % (ref 0–1)
BILIRUB SERPL-MCNC: 0.4 MG/DL (ref 0.2–1)
BILIRUB UR QL: NEGATIVE
BUN SERPL-MCNC: 9 MG/DL (ref 6–20)
BUN/CREAT SERPL: 11 (ref 12–20)
CALCIUM SERPL-MCNC: 9.2 MG/DL (ref 8.5–10.1)
CHLORIDE SERPL-SCNC: 110 MMOL/L (ref 97–108)
CO2 SERPL-SCNC: 28 MMOL/L (ref 21–32)
COLOR UR: NORMAL
COMMENT, HOLDF: NORMAL
CREAT SERPL-MCNC: 0.82 MG/DL (ref 0.55–1.02)
DIFFERENTIAL METHOD BLD: ABNORMAL
EOSINOPHIL # BLD: 0.3 K/UL (ref 0–0.4)
EOSINOPHIL NFR BLD: 3 % (ref 0–7)
EPITH CASTS URNS QL MICRO: NORMAL /LPF
ERYTHROCYTE [DISTWIDTH] IN BLOOD BY AUTOMATED COUNT: 11.3 % (ref 11.5–14.5)
GLOBULIN SER CALC-MCNC: 4.3 G/DL (ref 2–4)
GLUCOSE SERPL-MCNC: 108 MG/DL (ref 65–100)
GLUCOSE UR STRIP.AUTO-MCNC: NEGATIVE MG/DL
HCT VFR BLD AUTO: 39.1 % (ref 35–47)
HGB BLD-MCNC: 12.9 G/DL (ref 11.5–16)
HGB UR QL STRIP: NEGATIVE
HYALINE CASTS URNS QL MICRO: NORMAL /LPF (ref 0–5)
IMM GRANULOCYTES # BLD AUTO: 0 K/UL (ref 0–0.04)
IMM GRANULOCYTES NFR BLD AUTO: 0 % (ref 0–0.5)
KETONES UR QL STRIP.AUTO: NEGATIVE MG/DL
LEUKOCYTE ESTERASE UR QL STRIP.AUTO: NEGATIVE
LIPASE SERPL-CCNC: 137 U/L (ref 73–393)
LYMPHOCYTES # BLD: 2.5 K/UL (ref 0.8–3.5)
LYMPHOCYTES NFR BLD: 23 % (ref 12–49)
MCH RBC QN AUTO: 29.9 PG (ref 26–34)
MCHC RBC AUTO-ENTMCNC: 33 G/DL (ref 30–36.5)
MCV RBC AUTO: 90.7 FL (ref 80–99)
MONOCYTES # BLD: 0.7 K/UL (ref 0–1)
MONOCYTES NFR BLD: 6 % (ref 5–13)
NEUTS SEG # BLD: 7.2 K/UL (ref 1.8–8)
NEUTS SEG NFR BLD: 67 % (ref 32–75)
NITRITE UR QL STRIP.AUTO: NEGATIVE
NRBC # BLD: 0 K/UL (ref 0–0.01)
NRBC BLD-RTO: 0 PER 100 WBC
PH UR STRIP: 7 [PH] (ref 5–8)
PLATELET # BLD AUTO: 289 K/UL (ref 150–400)
PMV BLD AUTO: 10.2 FL (ref 8.9–12.9)
POTASSIUM SERPL-SCNC: 3.7 MMOL/L (ref 3.5–5.1)
PROT SERPL-MCNC: 7.9 G/DL (ref 6.4–8.2)
PROT UR STRIP-MCNC: NEGATIVE MG/DL
RBC # BLD AUTO: 4.31 M/UL (ref 3.8–5.2)
RBC #/AREA URNS HPF: NORMAL /HPF (ref 0–5)
SAMPLES BEING HELD,HOLD: NORMAL
SODIUM SERPL-SCNC: 142 MMOL/L (ref 136–145)
SP GR UR REFRACTOMETRY: 1.01 (ref 1–1.03)
UR CULT HOLD, URHOLD: NORMAL
UROBILINOGEN UR QL STRIP.AUTO: 1 EU/DL (ref 0.2–1)
WBC # BLD AUTO: 10.8 K/UL (ref 3.6–11)
WBC URNS QL MICRO: NORMAL /HPF (ref 0–4)

## 2019-08-12 PROCEDURE — 96375 TX/PRO/DX INJ NEW DRUG ADDON: CPT

## 2019-08-12 PROCEDURE — 81001 URINALYSIS AUTO W/SCOPE: CPT

## 2019-08-12 PROCEDURE — 96361 HYDRATE IV INFUSION ADD-ON: CPT

## 2019-08-12 PROCEDURE — 80299 QUANTITATIVE ASSAY DRUG: CPT

## 2019-08-12 PROCEDURE — 74011250636 HC RX REV CODE- 250/636: Performed by: EMERGENCY MEDICINE

## 2019-08-12 PROCEDURE — 80053 COMPREHEN METABOLIC PANEL: CPT

## 2019-08-12 PROCEDURE — 65270000029 HC RM PRIVATE

## 2019-08-12 PROCEDURE — 74011636320 HC RX REV CODE- 636/320: Performed by: RADIOLOGY

## 2019-08-12 PROCEDURE — 99218 HC RM OBSERVATION: CPT

## 2019-08-12 PROCEDURE — 83690 ASSAY OF LIPASE: CPT

## 2019-08-12 PROCEDURE — 74177 CT ABD & PELVIS W/CONTRAST: CPT

## 2019-08-12 PROCEDURE — 74011250636 HC RX REV CODE- 250/636: Performed by: NURSE PRACTITIONER

## 2019-08-12 PROCEDURE — 74011250637 HC RX REV CODE- 250/637: Performed by: INTERNAL MEDICINE

## 2019-08-12 PROCEDURE — 82397 CHEMILUMINESCENT ASSAY: CPT

## 2019-08-12 PROCEDURE — 96365 THER/PROPH/DIAG IV INF INIT: CPT

## 2019-08-12 PROCEDURE — 96374 THER/PROPH/DIAG INJ IV PUSH: CPT

## 2019-08-12 PROCEDURE — 99283 EMERGENCY DEPT VISIT LOW MDM: CPT

## 2019-08-12 PROCEDURE — 36415 COLL VENOUS BLD VENIPUNCTURE: CPT

## 2019-08-12 PROCEDURE — 96366 THER/PROPH/DIAG IV INF ADDON: CPT

## 2019-08-12 PROCEDURE — 85025 COMPLETE CBC W/AUTO DIFF WBC: CPT

## 2019-08-12 PROCEDURE — 74011250636 HC RX REV CODE- 250/636: Performed by: INTERNAL MEDICINE

## 2019-08-12 RX ORDER — ENOXAPARIN SODIUM 100 MG/ML
40 INJECTION SUBCUTANEOUS EVERY 24 HOURS
Status: DISCONTINUED | OUTPATIENT
Start: 2019-08-12 | End: 2019-08-13 | Stop reason: HOSPADM

## 2019-08-12 RX ORDER — SODIUM CHLORIDE 0.9 % (FLUSH) 0.9 %
5-40 SYRINGE (ML) INJECTION AS NEEDED
Status: DISCONTINUED | OUTPATIENT
Start: 2019-08-12 | End: 2019-08-13 | Stop reason: HOSPADM

## 2019-08-12 RX ORDER — MORPHINE SULFATE 4 MG/ML
4 INJECTION INTRAVENOUS
Status: COMPLETED | OUTPATIENT
Start: 2019-08-12 | End: 2019-08-12

## 2019-08-12 RX ORDER — ONDANSETRON 2 MG/ML
4 INJECTION INTRAMUSCULAR; INTRAVENOUS
Status: COMPLETED | OUTPATIENT
Start: 2019-08-12 | End: 2019-08-12

## 2019-08-12 RX ORDER — DIPHENHYDRAMINE HCL 25 MG
25 CAPSULE ORAL
Status: DISCONTINUED | OUTPATIENT
Start: 2019-08-12 | End: 2019-08-13 | Stop reason: HOSPADM

## 2019-08-12 RX ORDER — CIPROFLOXACIN 2 MG/ML
400 INJECTION, SOLUTION INTRAVENOUS EVERY 12 HOURS
Status: DISCONTINUED | OUTPATIENT
Start: 2019-08-12 | End: 2019-08-13

## 2019-08-12 RX ORDER — SODIUM CHLORIDE 0.9 % (FLUSH) 0.9 %
5-40 SYRINGE (ML) INJECTION EVERY 8 HOURS
Status: DISCONTINUED | OUTPATIENT
Start: 2019-08-12 | End: 2019-08-13 | Stop reason: HOSPADM

## 2019-08-12 RX ORDER — HYDROCODONE BITARTRATE AND ACETAMINOPHEN 5; 325 MG/1; MG/1
1 TABLET ORAL
Status: DISCONTINUED | OUTPATIENT
Start: 2019-08-12 | End: 2019-08-13 | Stop reason: HOSPADM

## 2019-08-12 RX ORDER — SODIUM CHLORIDE 9 MG/ML
75 INJECTION, SOLUTION INTRAVENOUS CONTINUOUS
Status: DISCONTINUED | OUTPATIENT
Start: 2019-08-12 | End: 2019-08-13

## 2019-08-12 RX ORDER — ONDANSETRON 2 MG/ML
4 INJECTION INTRAMUSCULAR; INTRAVENOUS
Status: DISCONTINUED | OUTPATIENT
Start: 2019-08-12 | End: 2019-08-13 | Stop reason: HOSPADM

## 2019-08-12 RX ORDER — MORPHINE SULFATE 4 MG/ML
2 INJECTION INTRAVENOUS
Status: DISCONTINUED | OUTPATIENT
Start: 2019-08-12 | End: 2019-08-13 | Stop reason: HOSPADM

## 2019-08-12 RX ORDER — NALOXONE HYDROCHLORIDE 0.4 MG/ML
0.4 INJECTION, SOLUTION INTRAMUSCULAR; INTRAVENOUS; SUBCUTANEOUS AS NEEDED
Status: DISCONTINUED | OUTPATIENT
Start: 2019-08-12 | End: 2019-08-13 | Stop reason: HOSPADM

## 2019-08-12 RX ORDER — ACETAMINOPHEN 325 MG/1
650 TABLET ORAL
Status: DISCONTINUED | OUTPATIENT
Start: 2019-08-12 | End: 2019-08-13 | Stop reason: HOSPADM

## 2019-08-12 RX ADMIN — ONDANSETRON 4 MG: 2 INJECTION INTRAMUSCULAR; INTRAVENOUS at 03:31

## 2019-08-12 RX ADMIN — MORPHINE SULFATE 2 MG: 4 INJECTION, SOLUTION INTRAMUSCULAR; INTRAVENOUS at 08:00

## 2019-08-12 RX ADMIN — ENOXAPARIN SODIUM 40 MG: 40 INJECTION SUBCUTANEOUS at 09:39

## 2019-08-12 RX ADMIN — HYDROCODONE BITARTRATE AND ACETAMINOPHEN 1 TABLET: 5; 325 TABLET ORAL at 13:11

## 2019-08-12 RX ADMIN — Medication 10 ML: at 05:50

## 2019-08-12 RX ADMIN — IOPAMIDOL 100 ML: 755 INJECTION, SOLUTION INTRAVENOUS at 04:47

## 2019-08-12 RX ADMIN — MORPHINE SULFATE 4 MG: 4 INJECTION, SOLUTION INTRAMUSCULAR; INTRAVENOUS at 03:31

## 2019-08-12 RX ADMIN — CIPROFLOXACIN 400 MG: 2 INJECTION, SOLUTION INTRAVENOUS at 23:29

## 2019-08-12 RX ADMIN — SODIUM CHLORIDE 75 ML/HR: 900 INJECTION, SOLUTION INTRAVENOUS at 10:33

## 2019-08-12 RX ADMIN — METHYLPREDNISOLONE SODIUM SUCCINATE 60 MG: 40 INJECTION, POWDER, FOR SOLUTION INTRAMUSCULAR; INTRAVENOUS at 13:05

## 2019-08-12 RX ADMIN — METHYLPREDNISOLONE SODIUM SUCCINATE 125 MG: 125 INJECTION, POWDER, FOR SOLUTION INTRAMUSCULAR; INTRAVENOUS at 05:49

## 2019-08-12 RX ADMIN — SODIUM CHLORIDE 1000 ML: 900 INJECTION, SOLUTION INTRAVENOUS at 03:25

## 2019-08-12 RX ADMIN — ONDANSETRON 4 MG: 2 INJECTION INTRAMUSCULAR; INTRAVENOUS at 23:43

## 2019-08-12 RX ADMIN — Medication 10 ML: at 14:00

## 2019-08-12 RX ADMIN — CIPROFLOXACIN 400 MG: 2 INJECTION, SOLUTION INTRAVENOUS at 13:12

## 2019-08-12 NOTE — ED PROVIDER NOTES
This is a 78-year-old female comes emergency room with chief complaint of right lower quadrant abdominal pain. Patient states that her pain started approximately 10:00 PM last night. Patient states the pain is gradually worsened. Patient has had some nausea but no emesis. Patient denies any constipation or diarrhea. Patient does have a history of Crohn's. Patient suspects that she might be having a flareup of her Crohn's. Patient denies any chest pain or shortness of breath. Patient is currently on Humira for her Crohn's. Dr. Gabriel Kellogg is pt's GI. The history is provided by the patient. No  was used. Abdominal Pain    This is a recurrent problem. The current episode started 3 to 5 hours ago. The problem occurs constantly. The problem has been gradually worsening. The pain is located in the RLQ. The quality of the pain is sharp and cramping. The pain is at a severity of 8/10. The pain is moderate. Associated symptoms include nausea. Pertinent negatives include no fever, no diarrhea, no vomiting, no constipation, no dysuria, no frequency, no hematuria, no myalgias, no chest pain and no back pain. The pain is worsened by palpation. The pain is relieved by nothing. Past workup includes CT scan, colonoscopy. Past workup includes no surgery. Her past medical history is significant for Crohn's disease. The patient's surgical history non-contributory.        Past Medical History:   Diagnosis Date    Fatigue     Headache        Past Surgical History:   Procedure Laterality Date    HX HEENT           Family History:   Problem Relation Age of Onset    Cancer Maternal Grandmother     Headache Maternal Grandmother     Cancer Paternal Grandmother     Cancer Other        Social History     Socioeconomic History    Marital status:      Spouse name: Not on file    Number of children: Not on file    Years of education: Not on file    Highest education level: Not on file Occupational History    Not on file   Social Needs    Financial resource strain: Not on file    Food insecurity:     Worry: Not on file     Inability: Not on file    Transportation needs:     Medical: Not on file     Non-medical: Not on file   Tobacco Use    Smoking status: Never Smoker    Smokeless tobacco: Never Used   Substance and Sexual Activity    Alcohol use: Yes     Comment: socially    Drug use: No    Sexual activity: Not on file   Lifestyle    Physical activity:     Days per week: Not on file     Minutes per session: Not on file    Stress: Not on file   Relationships    Social connections:     Talks on phone: Not on file     Gets together: Not on file     Attends Evangelical service: Not on file     Active member of club or organization: Not on file     Attends meetings of clubs or organizations: Not on file     Relationship status: Not on file    Intimate partner violence:     Fear of current or ex partner: Not on file     Emotionally abused: Not on file     Physically abused: Not on file     Forced sexual activity: Not on file   Other Topics Concern    Not on file   Social History Narrative    Not on file     ALLERGIES: Patient has no known allergies. Review of Systems   Constitutional: Negative for appetite change, chills, fever and unexpected weight change. HENT: Negative for ear pain, hearing loss, rhinorrhea and trouble swallowing. Eyes: Negative for pain and visual disturbance. Respiratory: Negative for cough, chest tightness and shortness of breath. Cardiovascular: Negative for chest pain and palpitations. Gastrointestinal: Positive for abdominal pain and nausea. Negative for abdominal distention, blood in stool, constipation, diarrhea and vomiting. Genitourinary: Negative for dysuria, frequency, hematuria and urgency. Musculoskeletal: Negative for back pain and myalgias. Skin: Negative for rash.    Neurological: Negative for dizziness, syncope, weakness and numbness. Psychiatric/Behavioral: Negative for confusion and suicidal ideas. All other systems reviewed and are negative. Vitals:    08/12/19 0308 08/12/19 0400 08/12/19 0430   BP: (!) 177/108 (!) 147/98 133/87   Pulse: 84     Resp: 17     Temp: 98.5 °F (36.9 °C)     SpO2: 100% 98% 98%   Weight: 80.7 kg (178 lb)     Height: 5' 10\" (1.778 m)              Physical Exam   Constitutional: She is oriented to person, place, and time. She appears well-developed and well-nourished. No distress. HENT:   Head: Normocephalic and atraumatic. Right Ear: External ear normal.   Left Ear: External ear normal.   Nose: Nose normal.   Mouth/Throat: Oropharynx is clear and moist. No oropharyngeal exudate. Eyes: Pupils are equal, round, and reactive to light. Conjunctivae and EOM are normal. Right eye exhibits no discharge. Left eye exhibits no discharge. No scleral icterus. Neck: Normal range of motion. Neck supple. No JVD present. No tracheal deviation present. Cardiovascular: Normal rate, regular rhythm, normal heart sounds and intact distal pulses. Exam reveals no gallop and no friction rub. No murmur heard. Pulmonary/Chest: Effort normal and breath sounds normal. No stridor. No respiratory distress. She has no decreased breath sounds. She has no wheezes. She has no rhonchi. She has no rales. She exhibits no tenderness. Abdominal: Soft. Bowel sounds are normal. She exhibits no distension. There is tenderness in the right lower quadrant. There is guarding. There is no rebound. Musculoskeletal: Normal range of motion. She exhibits no edema or tenderness. Neurological: She is alert and oriented to person, place, and time. She has normal strength and normal reflexes. She displays normal reflexes. No cranial nerve deficit or sensory deficit. She exhibits normal muscle tone. Coordination normal. GCS eye subscore is 4. GCS verbal subscore is 5. GCS motor subscore is 6. Skin: Skin is warm and dry.  Capillary refill takes less than 2 seconds. No rash noted. She is not diaphoretic. No erythema. No pallor. Psychiatric: She has a normal mood and affect. Her behavior is normal. Judgment and thought content normal.   Nursing note and vitals reviewed. MDM       Procedures    Chief Complaint   Patient presents with    Abdominal Pain    Nausea       The patient's presenting problems have been discussed, and they are in agreement with the care plan formulated and outlined with them. I have encouraged them to ask questions as they arise throughout their visit.     MEDICATIONS GIVEN:  Medications   sodium chloride (NS) flush 5-40 mL (10 mL IntraVENous Given 8/12/19 0550)   sodium chloride (NS) flush 5-40 mL (has no administration in time range)   morphine injection 2 mg (has no administration in time range)   sodium chloride 0.9 % bolus infusion 1,000 mL (1,000 mL IntraVENous New Bag 8/12/19 0325)   ondansetron (ZOFRAN) injection 4 mg (4 mg IntraVENous Given 8/12/19 0331)   morphine injection 4 mg (4 mg IntraVENous Given 8/12/19 0331)   iopamidol (ISOVUE-370) 76 % injection 100 mL (100 mL IntraVENous Given 8/12/19 7227)   methylPREDNISolone (PF) (Solu-MEDROL) injection 125 mg (125 mg IntraVENous Given 8/12/19 0506)       LABS REVIEWED:  Recent Results (from the past 24 hour(s))   URINALYSIS W/MICROSCOPIC    Collection Time: 08/12/19  3:17 AM   Result Value Ref Range    Color YELLOW/STRAW      Appearance CLEAR CLEAR      Specific gravity 1.011 1.003 - 1.030      pH (UA) 7.0 5.0 - 8.0      Protein NEGATIVE  NEG mg/dL    Glucose NEGATIVE  NEG mg/dL    Ketone NEGATIVE  NEG mg/dL    Bilirubin NEGATIVE  NEG      Blood NEGATIVE  NEG      Urobilinogen 1.0 0.2 - 1.0 EU/dL    Nitrites NEGATIVE  NEG      Leukocyte Esterase NEGATIVE  NEG      WBC 0-4 0 - 4 /hpf    RBC 0-5 0 - 5 /hpf    Epithelial cells FEW FEW /lpf    Bacteria NEGATIVE  NEG /hpf    Hyaline cast 0-2 0 - 5 /lpf   URINE CULTURE HOLD SAMPLE    Collection Time: 08/12/19 3: 17 AM   Result Value Ref Range    Urine culture hold        URINE ON HOLD IN MICROBIOLOGY DEPT FOR 3 DAYS. IF UNPRESERVED URINE IS SUBMITTED, IT CANNOT BE USED FOR ADDITIONAL TESTING AFTER 24 HRS, RECOLLECTION WILL BE REQUIRED. CBC WITH AUTOMATED DIFF    Collection Time: 08/12/19  3:17 AM   Result Value Ref Range    WBC 10.8 3.6 - 11.0 K/uL    RBC 4.31 3.80 - 5.20 M/uL    HGB 12.9 11.5 - 16.0 g/dL    HCT 39.1 35.0 - 47.0 %    MCV 90.7 80.0 - 99.0 FL    MCH 29.9 26.0 - 34.0 PG    MCHC 33.0 30.0 - 36.5 g/dL    RDW 11.3 (L) 11.5 - 14.5 %    PLATELET 070 516 - 977 K/uL    MPV 10.2 8.9 - 12.9 FL    NRBC 0.0 0  WBC    ABSOLUTE NRBC 0.00 0.00 - 0.01 K/uL    NEUTROPHILS 67 32 - 75 %    LYMPHOCYTES 23 12 - 49 %    MONOCYTES 6 5 - 13 %    EOSINOPHILS 3 0 - 7 %    BASOPHILS 1 0 - 1 %    IMMATURE GRANULOCYTES 0 0.0 - 0.5 %    ABS. NEUTROPHILS 7.2 1.8 - 8.0 K/UL    ABS. LYMPHOCYTES 2.5 0.8 - 3.5 K/UL    ABS. MONOCYTES 0.7 0.0 - 1.0 K/UL    ABS. EOSINOPHILS 0.3 0.0 - 0.4 K/UL    ABS. BASOPHILS 0.1 0.0 - 0.1 K/UL    ABS. IMM. GRANS. 0.0 0.00 - 0.04 K/UL    DF AUTOMATED     METABOLIC PANEL, COMPREHENSIVE    Collection Time: 08/12/19  3:17 AM   Result Value Ref Range    Sodium 142 136 - 145 mmol/L    Potassium 3.7 3.5 - 5.1 mmol/L    Chloride 110 (H) 97 - 108 mmol/L    CO2 28 21 - 32 mmol/L    Anion gap 4 (L) 5 - 15 mmol/L    Glucose 108 (H) 65 - 100 mg/dL    BUN 9 6 - 20 MG/DL    Creatinine 0.82 0.55 - 1.02 MG/DL    BUN/Creatinine ratio 11 (L) 12 - 20      GFR est AA >60 >60 ml/min/1.73m2    GFR est non-AA >60 >60 ml/min/1.73m2    Calcium 9.2 8.5 - 10.1 MG/DL    Bilirubin, total 0.4 0.2 - 1.0 MG/DL    ALT (SGPT) 15 12 - 78 U/L    AST (SGOT) 12 (L) 15 - 37 U/L    Alk.  phosphatase 65 45 - 117 U/L    Protein, total 7.9 6.4 - 8.2 g/dL    Albumin 3.6 3.5 - 5.0 g/dL    Globulin 4.3 (H) 2.0 - 4.0 g/dL    A-G Ratio 0.8 (L) 1.1 - 2.2     LIPASE    Collection Time: 08/12/19  3:17 AM   Result Value Ref Range    Lipase 137 73 - 393 U/L   SAMPLES BEING HELD    Collection Time: 08/12/19  3:17 AM   Result Value Ref Range    SAMPLES BEING HELD SST,RD.PARIS,DRK GRN     COMMENT        Add-on orders for these samples will be processed based on acceptable specimen integrity and analyte stability, which may vary by analyte. VITAL SIGNS:  Patient Vitals for the past 24 hrs:   Temp Pulse Resp BP SpO2   08/12/19 0430    133/87 98 %   08/12/19 0400    (!) 147/98 98 %   08/12/19 0308 98.5 °F (36.9 °C) 84 17 (!) 177/108 100 %       RADIOLOGY RESULTS:  The following have been ordered and reviewed:  Ct Abd Pelv W Cont    Result Date: 8/12/2019  EXAM: CT ABD PELV W CONT INDICATION: RLQ abd pain, h/o crohns COMPARISON: 2018 CONTRAST: 100 mL of Isovue-370. TECHNIQUE: Following the uneventful intravenous administration of contrast, thin axial images were obtained through the abdomen and pelvis. Coronal and sagittal reconstructions were generated. Oral contrast was not administered. CT dose reduction was achieved through use of a standardized protocol tailored for this examination and automatic exposure control for dose modulation. FINDINGS: LUNG BASES: Clear. INCIDENTALLY IMAGED HEART AND MEDIASTINUM: Unremarkable. LIVER: No mass or biliary dilatation. GALLBLADDER: Unremarkable. SPLEEN: No mass. PANCREAS: No mass or ductal dilatation. ADRENALS: Unremarkable. KIDNEYS: No mass, calculus, or hydronephrosis. STOMACH: Unremarkable. SMALL BOWEL: There are distended ileal bowel loops with air feces sign, and there is thickening of the terminal ileum, similar to the exam from 2018 COLON: No dilatation or wall thickening. APPENDIX: Unremarkable. PERITONEUM: No ascites or pneumoperitoneum. RETROPERITONEUM: No lymphadenopathy or aortic aneurysm. REPRODUCTIVE ORGANS: Intrauterine device in place. URINARY BLADDER: No mass or calculus. BONES: No destructive bone lesion.  ADDITIONAL COMMENTS: N/A     IMPRESSION: Thickening of the terminal ileum with dilatation of proximal small bowel, similar to the examination from 2018. CONSULTATIONS:   CONSULT NOTE:  5:16 AM Melania Perez DO spoke with Dr. Lian Ruby, Consult for Hospitalist.  Discussed available diagnostic tests and clinical findings. He is in agreement with care plans as outlined. Dr. Lian Ruby will see and admit pt for further evaluation and treatment. CONSULT NOTE:  5:54 AM Melania Perez DO spoke with Dr. Jeremias Minor for Gastroenterology. Discussed available diagnostic tests and clinical findings. He is in agreement with care plans as outlined. Dr. Vlad Joshi agrees with steroids. Will have GI see later today. PROGRESS NOTES:  Discussed results and plan with patient. Patient will be admitted/observed for further evaluation and treatment. DIAGNOSIS:    1. RLQ abdominal pain    2. Crohn's disease involving terminal ileum ([address]    3. Nausea without vomiting    4. Exacerbation of Crohn's disease without complication ([address]        PLAN:    Admit/obs    ED COURSE: The patient's hospital course has been uncomplicated.

## 2019-08-12 NOTE — PROGRESS NOTES
Bedside shift change report given to Tony Palomino (oncoming nurse) by Hafsa Pina (offgoing nurse). Report included the following information SBAR, Kardex, ED Summary and MAR.

## 2019-08-12 NOTE — ROUTINE PROCESS
TRANSFER - OUT REPORT: 
 
Verbal report given to Hailee Webber (name) on [facility]  being transferred to 5th floor (unit) for routine progression of care Report consisted of patients Situation, Background, Assessment and  
Recommendations(SBAR). Information from the following report(s) SBAR, ED Summary, [facility] and Recent Results was reviewed with the receiving nurse. Lines:  
Peripheral IV 08/12/19 Left Antecubital (Active) Site Assessment Clean, dry, & intact 8/12/2019  3:25 AM  
Phlebitis Assessment 0 8/12/2019  3:25 AM  
Infiltration Assessment 0 8/12/2019  3:25 AM  
Dressing Status New 8/12/2019  3:25 AM  
Hub Color/Line Status Green 8/12/2019  3:25 AM  
  
 
Opportunity for questions and clarification was provided. Patient transported with: 
 [employer]

## 2019-08-12 NOTE — CONSULTS
Patient seen and examined admitted earlier today, feels better await GI input. Please see H&P by Dr. Emmy Cronin for full details.

## 2019-08-12 NOTE — PROGRESS NOTES
CM Note:  Met with pt for d/c planning. PTA pt stated she was independent with ADL's, was driving and walked unaided. She gets her medications from [facility] [id number] in [place]. Reason for Admission:   Crohn's exacerbation                   RRAT Score:      none               Plan for utilizing home health:   none                       Current Advanced Directive/Advance Care Plan:  is next of kin                         Transition of Care Plan:                    1. GI following  2. Pt to d/c to home transported by her . PARRISH Platt    Care Management Interventions  PCP Verified by CM: Yes(Dr. Priscilla Ervin. No NN.)  Palliative Care Criteria Met (RRAT>21 & CHF Dx)?: No  Mode of Transport at Discharge:  Other (see comment)()  MyChart Signup: No  Discharge Durable Medical Equipment: No  Physical Therapy Consult: No  Occupational Therapy Consult: No  Speech Therapy Consult: No  Current Support Network: Own Home, Lives with Spouse(Lives with her  and 3 children in a 2 story house with 16 interior stairs and 4 entry steps.)  Plan discussed with Pt/Family/Caregiver: Yes  Discharge Location  Discharge Placement: Home with two level

## 2019-08-12 NOTE — ROUTINE PROCESS
Bedside and Verbal shift change report given to Ana Fairbanks RN (oncoming nurse) by Valerie Craven RN (offgoing nurse). Report included the following information SBAR, Kardex, Intake/Output, MAR, Accordion, Recent Results and Med Rec Status.

## 2019-08-12 NOTE — CONSULTS
[address] [place] [address]. [address] [place] [address] NP  [phone]                    GASTROENTEROLOGY CONSULTATION NOTE              NAME:  Adi Gee   :   1985   MRN:   [id number]       Referring Physician:  Dr. Ginny Rand Date:   2019 12:25 PM    Chief Complaint:    Crohns Flare     History of Present Illness:    Patient is a 37 y.o. who we were asked to see in consultation for the above complaint. The patient presented from home with complaints of RLQ abdominal pain which started 2 weeks ago. She has a medical history notable for Crohns disease and is on humira. Her symptoms have been associated with nausea but not vomiting, fevers, constipation. Her work up here was notable for thickening of terminal ileum and dilation of proximal small bowel. PMH:  Past Medical History:   Diagnosis Date    Fatigue     Headache        PSH:  Past Surgical History:   Procedure Laterality Date    HX HEENT         Allergies:  No Known Allergies    Home Medications:  Prior to Admission Medications   Prescriptions Last Dose Informant Patient Reported? Taking? L.acid/L.casei/B.bif/B.sindy/FOS (PROBIOTIC BLEND PO) 2019 at am Self Yes Yes   Sig: Take 1 Cap by mouth daily. OTHER,NON-FORMULARY, 2019 at pm  Self Yes Yes   Sig: Take 1 Dose by mouth. Juice plus gummy   OTHER,NON-FORMULARY, 2019 at am Self Yes Yes   Sig: Take 1 Packet by mouth daily. Plexus Pink Drink   adalimumab (HUMIRA) 40 mg/0.8 mL injection 2019 at pm Self Yes Yes   Si mg by SubCUTAneous route every fourteen (14) days. Indications: Crohn's Disease   cholecalciferol (VITAMIN D3) 1,000 unit tablet 2019 at pm Self Yes Yes   Sig: Take 1,000 Units by mouth daily. ondansetron (ZOFRAN ODT) 4 mg disintegrating tablet 2019 at am Self No Yes   Sig: Take 1 Tab by mouth every eight (8) hours as needed for Nausea.       Facility-Administered Medications: None       Hospital Medications:  Current Facility-Administered Medications   Medication Dose Route Frequency    sodium chloride (NS) flush 5-40 mL  5-40 mL IntraVENous Q8H    sodium chloride (NS) flush 5-40 mL  5-40 mL IntraVENous PRN    naloxone (NARCAN) injection 0.4 mg  0.4 mg IntraVENous PRN    0.9% sodium chloride infusion  75 mL/hr IntraVENous CONTINUOUS    acetaminophen (TYLENOL) tablet 650 mg  650 mg Oral Q4H PRN    HYDROcodone-acetaminophen (NORCO) 5-325 mg per tablet 1 Tab  1 Tab Oral Q4H PRN    diphenhydrAMINE (BENADRYL) capsule 25 mg  25 mg Oral Q4H PRN    ondansetron (ZOFRAN) injection 4 mg  4 mg IntraVENous Q4H PRN    enoxaparin (LOVENOX) injection 40 mg  40 mg SubCUTAneous Q24H    morphine injection 2 mg  2 mg IntraVENous Q4H PRN    aluminum-magnesium hydroxide (MAALOX) oral suspension 30 mL  30 mL Oral Q6H PRN    methylPREDNISolone (PF) (SOLU-MEDROL) injection 60 mg  60 mg IntraVENous DAILY    ciprofloxacin (CIPRO) 400 mg in D5W IVPB (premix)  400 mg IntraVENous Q12H       Social History:  Social History     Tobacco Use    Smoking status: Never Smoker    Smokeless tobacco: Never Used   Substance Use Topics    Alcohol use: Yes     Comment: socially       Family History:  Family History   Problem Relation Age of Onset    Cancer Maternal Grandmother     Headache Maternal Grandmother     Cancer Paternal Grandmother     Cancer Other        Review of Systems:  Constitutional: negative fever, negative chills, negative weight loss  Eyes:   negative visual changes  ENT:   negative sore throat, tongue or lip swelling  Respiratory:  negative cough, negative dyspnea  Cards:  negative for chest pain, palpitations, lower extremity edema  GI:   See HPI  :  negative for frequency, dysuria  Integument:  negative for rash and pruritus  Heme:  negative for easy bruising and gum/nose bleeding  Musculoskel: negative for myalgias,  back pain and muscle weakness  Neuro: negative for headaches, dizziness, vertigo  Psych:  negative for feelings of anxiety, depression     Objective:     Patient Vitals for the past 8 hrs:   BP Temp Pulse Resp SpO2   08/12/19 1106 116/70 98.4 °F (36.9 °C) 72 16    08/12/19 0720 (!) 147/97 98.5 °F (36.9 °C) 66 16 98 %   08/12/19 0635 (!) 136/91 97.7 °F (36.5 °C) 73 16 99 %   08/12/19 0430 133/87    98 %     No intake/output data recorded. No intake/output data recorded. EXAM:     NEURO-alert, oriented x3, affect appropriate   HEENT-Head: Normocephalic, no lesions, without obvious abnormality. LUNGS-clear to auscultation bilaterally    COR-regular rate and rhythym     ABD- soft, non-tender. Bowel sounds normal. No masses,  no organomegaly     EXT-no edema    Skin - No rash     Data Review     Recent Labs     08/12/19 0317   WBC 10.8   HGB 12.9   HCT 39.1        Recent Labs     08/12/19 0317      K 3.7   *   CO2 28   BUN 9   CREA 0.82   *   CA 9.2     Recent Labs     08/12/19 0317   SGOT 12*   AP 65   TP 7.9   ALB 3.6   GLOB 4.3*   LPSE 137     No results for input(s): INR, PTP, APTT in the last 72 hours. No lab exists for component: INREXT    Patient Active Problem List   Diagnosis Code    Headache, chronic daily R51    Crohn's disease ([facility] [address] K50.90    Leukocytosis D72.829    Abdominal pain R10.9    Nausea R11.0    Exacerbation of Crohn's disease ([facility] [address] K50.90       Assessment and Plan:  Crohns Disease: noted to have thickened TI on CT Scan    - IV methylpred  - Started Cipro  - Clear liquid diet  - Continue supportive care  - Will check humira levels and antibody levels. Thanks for allowing me to participate in the care of this patient.   Signed By: Azra Mercedes NP     8/12/2019  12:25 PM

## 2019-08-12 NOTE — ROUTINE PROCESS
Interdisciplinary team rounds were held 8/12/2019 with the following team members:Care Management, Nursing, Nutrition, Pharmacy, Physical Therapy and Physician. Plan of care discussed. See clinical pathway and/or care plan for interventions and desired outcomes. Plan: GI to see, continue IV steroids.

## 2019-08-12 NOTE — ED NOTES
Pt Throughput: Charge Nurse on 5th floor  made aware of patient's bed assignment, room 510. Reece Jimenez RN  Emergency Dept Charge RN.  used

## 2019-08-12 NOTE — ED TRIAGE NOTES
Patient arrives for complaints of RLQ abd pain and nausea.  Patient has history of crohns and concerned she is possibly having a flare up

## 2019-08-12 NOTE — H&P
[facility]    Hospitalist Admission Note      NAME:  Naseem Sterling   :   1985   MRN:  [id number]     PCP:  Shayna Reaves MD     Date/Time:  2019 5:34 AM          Subjective:     CHIEF COMPLAINT: abd pain     HISTORY OF PRESENT ILLNESS:     Ms. Terry Macias is a 37 y.o.  female who presented to the Emergency Department complaining of abd pain and nausea. Present on and off for weeks, visited GI 4 days ago, but declined steroids. Currently on Humira. Symptoms similar to prior Crohns. CT shows think terminal ileum consistent with Crohns. We will admit her for management. Past Medical History:   Diagnosis Date    Fatigue     Headache         Past Surgical History:   Procedure Laterality Date    HX HEENT         Social History     Tobacco Use    Smoking status: Never Smoker    Smokeless tobacco: Never Used   Substance Use Topics    Alcohol use: Yes     Comment: socially        Family History   Problem Relation Age of Onset    Cancer Maternal Grandmother     Headache Maternal Grandmother     Cancer Paternal Grandmother     Cancer Other       No Known Allergies     Prior to Admission medications    Medication Sig Start Date End Date Taking? Authorizing Provider   predniSONE (DELTASONE) 20 mg tablet Take 40mg (4 tabs) daily x 1 week then 30mg (3 tabs) daily x 1 week then 20mg (2 tabs) daily x 1 week the 10mg (1 tab) daily x 1 week then stop 18   Evangelist Aguilar MD   adalimumab (HUMIRA) 40 mg/0.8 mL injection 40 mg by SubCUTAneous route every fourteen (14) days. Indications: Crohn's Disease    Other, MD Nuris   cholecalciferol (VITAMIN D3) 1,000 unit tablet Take 1,000 Units by mouth daily. Provider, Historical   OTHER,NON-FORMULARY, Juice plus gummy - Take four gummies twice daily    Provider, Historical   ondansetron (ZOFRAN ODT) 4 mg disintegrating tablet Take 1 Tab by mouth every eight (8) hours as needed for Nausea.  17   Chris Rubi NP Review of Systems:  (bold if positive, if negative)    Gen:  Eyes:  ENT:  CVS:  Pulm:  GI:  Abdominal pain, nausea, emesis,:  MS:  Skin:  Psych:  Endo:  Hem:  Renal:  Neuro:        Objective:      VITALS:    Vital signs reviewed; most recent are:    Visit Vitals  /87   Pulse 84   Temp 98.5 °F (36.9 °C)   Resp 17   Ht 5' 10\" (1.778 m)   Wt 80.7 kg (178 lb)   SpO2 98%   BMI 25.54 kg/m²     SpO2 Readings from Last 6 Encounters:   08/12/19 98%   06/01/18 96%   07/14/17 100%   02/17/16 98%        No intake or output data in the 24 hours ending 08/12/19 0534     Exam:     Physical Exam:    Gen:  Well-developed, well-nourished, in no acute distress  HEENT:  Pink conjunctivae, PERRL, hearing intact to voice, moist mucous membranes  Neck:  Supple, without masses, thyroid non-tender  Resp:  No accessory muscle use, clear breath sounds without wheezes rales or rhonchi  Card:  No murmurs, normal S1, S2 without thrills, bruits or peripheral edema  Abd:  Soft, non-tender, non-distended, normoactive bowel sounds are present, no mass  Lymph:  No cervical or inguinal adenopathy  Musc:  No cyanosis or clubbing  Skin:  No rashes or ulcers, skin turgor is good  Neuro:  Cranial nerves are grossly intact, no focal motor weakness, follows commands appropriately  Psych:  Good insight, oriented to person, place and time, alert     Labs:    Recent Labs     08/12/19 0317   WBC 10.8   HGB 12.9   HCT 39.1        Recent Labs     08/12/19 0317      K 3.7   *   CO2 28   *   BUN 9   CREA 0.82   CA 9.2   ALB 3.6   TBILI 0.4   SGOT 12*   ALT 15     No results found for: GLUCPOC  No results for input(s): PH, PCO2, PO2, HCO3, FIO2 in the last 72 hours. No results for input(s): INR in the last 72 hours.     No lab exists for component: INREXT  All Micro Results     Procedure Component Value Units Date/Time    URINE CULTURE HOLD SAMPLE [[id number]] Collected:  08/12/19 0317    Order Status:  Completed Specimen: Urine from Serum Updated:  08/12/19 0335     Urine culture hold       URINE ON HOLD IN MICROBIOLOGY DEPT FOR 3 DAYS. IF UNPRESERVED URINE IS SUBMITTED, IT CANNOT BE USED FOR ADDITIONAL TESTING AFTER 24 HRS, RECOLLECTION WILL BE REQUIRED. Assessment and Plan:      Exacerbation of Crohn's disease / Hx Crohn's disease / Abdominal pain / Nausea - POA. Followed by Dr Sp Gilbert. In past has been on steroid and 5-ASA therapy, then on Humira. CT abdomen today showed thickening of the terminal ileum compatible with the history of Crohn's disease, with significant proximal ileal dilatation, also similar to the prior study. NPO.  IVF. Pain control with IV morphine if necessary. Nausea control. GI consult. ER started IV solumedrol, GI to determine plan. Telemetry reviewed:   normal sinus rhythm    Risk of deterioration: high      Total time spent with patient: 79 Minutes I reviewed chart, notes, data and current medications in the medical record. I have examined and treated the patient at bedside during this period.                  Care Plan discussed with: Patient, Nursing Staff and >50% of time spent in counseling and coordination of care    Discussed:  Care Plan       ___________________________________________________    Attending Physician: Damian Bauer MD

## 2019-08-13 VITALS
HEIGHT: 70 IN | RESPIRATION RATE: 16 BRPM | HEART RATE: 68 BPM | BODY MASS INDEX: 25.48 KG/M2 | SYSTOLIC BLOOD PRESSURE: 122 MMHG | DIASTOLIC BLOOD PRESSURE: 78 MMHG | TEMPERATURE: 98.1 F | OXYGEN SATURATION: 97 % | WEIGHT: 178 LBS

## 2019-08-13 PROBLEM — R11.0 NAUSEA: Status: RESOLVED | Noted: 2018-05-30 | Resolved: 2019-08-13

## 2019-08-13 PROBLEM — R10.9 ABDOMINAL PAIN: Status: RESOLVED | Noted: 2018-05-30 | Resolved: 2019-08-13

## 2019-08-13 PROBLEM — D72.829 LEUKOCYTOSIS: Status: RESOLVED | Noted: 2018-05-30 | Resolved: 2019-08-13

## 2019-08-13 LAB
ANION GAP SERPL CALC-SCNC: 6 MMOL/L (ref 5–15)
BUN SERPL-MCNC: 7 MG/DL (ref 6–20)
BUN/CREAT SERPL: 10 (ref 12–20)
CALCIUM SERPL-MCNC: 8.3 MG/DL (ref 8.5–10.1)
CHLORIDE SERPL-SCNC: 110 MMOL/L (ref 97–108)
CO2 SERPL-SCNC: 26 MMOL/L (ref 21–32)
CREAT SERPL-MCNC: 0.67 MG/DL (ref 0.55–1.02)
CRP SERPL HS-MCNC: 1.3 MG/L
ERYTHROCYTE [DISTWIDTH] IN BLOOD BY AUTOMATED COUNT: 11.3 % (ref 11.5–14.5)
ERYTHROCYTE [SEDIMENTATION RATE] IN BLOOD: 13 MM/HR (ref 0–20)
GLUCOSE SERPL-MCNC: 121 MG/DL (ref 65–100)
HCT VFR BLD AUTO: 36.2 % (ref 35–47)
HGB BLD-MCNC: 11.6 G/DL (ref 11.5–16)
MAGNESIUM SERPL-MCNC: 2 MG/DL (ref 1.6–2.4)
MCH RBC QN AUTO: 29.4 PG (ref 26–34)
MCHC RBC AUTO-ENTMCNC: 32 G/DL (ref 30–36.5)
MCV RBC AUTO: 91.6 FL (ref 80–99)
NRBC # BLD: 0 K/UL (ref 0–0.01)
NRBC BLD-RTO: 0 PER 100 WBC
PLATELET # BLD AUTO: 255 K/UL (ref 150–400)
PMV BLD AUTO: 10.3 FL (ref 8.9–12.9)
POTASSIUM SERPL-SCNC: 3.6 MMOL/L (ref 3.5–5.1)
RBC # BLD AUTO: 3.95 M/UL (ref 3.8–5.2)
SODIUM SERPL-SCNC: 142 MMOL/L (ref 136–145)
WBC # BLD AUTO: 13.6 K/UL (ref 3.6–11)

## 2019-08-13 PROCEDURE — 85027 COMPLETE CBC AUTOMATED: CPT

## 2019-08-13 PROCEDURE — 85652 RBC SED RATE AUTOMATED: CPT

## 2019-08-13 PROCEDURE — 99218 HC RM OBSERVATION: CPT

## 2019-08-13 PROCEDURE — 96366 THER/PROPH/DIAG IV INF ADDON: CPT

## 2019-08-13 PROCEDURE — 36415 COLL VENOUS BLD VENIPUNCTURE: CPT

## 2019-08-13 PROCEDURE — 80048 BASIC METABOLIC PNL TOTAL CA: CPT

## 2019-08-13 PROCEDURE — 74011636637 HC RX REV CODE- 636/637: Performed by: HOSPITALIST

## 2019-08-13 PROCEDURE — 83735 ASSAY OF MAGNESIUM: CPT

## 2019-08-13 PROCEDURE — 86141 C-REACTIVE PROTEIN HS: CPT

## 2019-08-13 PROCEDURE — 74011250636 HC RX REV CODE- 250/636: Performed by: INTERNAL MEDICINE

## 2019-08-13 PROCEDURE — 74011250637 HC RX REV CODE- 250/637: Performed by: HOSPITALIST

## 2019-08-13 RX ORDER — PREDNISONE 20 MG/1
10 TABLET ORAL
Qty: 55 TAB | Refills: 0 | Status: SHIPPED | OUTPATIENT
Start: 2019-08-13 | End: 2019-08-13 | Stop reason: SDUPTHER

## 2019-08-13 RX ORDER — PREDNISONE 20 MG/1
40 TABLET ORAL
Status: DISCONTINUED | OUTPATIENT
Start: 2019-08-13 | End: 2019-08-13 | Stop reason: HOSPADM

## 2019-08-13 RX ORDER — CIPROFLOXACIN 500 MG/1
500 TABLET ORAL 2 TIMES DAILY
Qty: 14 TAB | Refills: 0 | Status: SHIPPED | OUTPATIENT
Start: 2019-08-13 | End: 2019-08-20

## 2019-08-13 RX ORDER — PREDNISONE 20 MG/1
10 TABLET ORAL
Qty: 55 TAB | Refills: 0 | OUTPATIENT
Start: 2019-08-13 | End: 2019-12-31

## 2019-08-13 RX ORDER — CIPROFLOXACIN 500 MG/1
500 TABLET ORAL 2 TIMES DAILY
Status: DISCONTINUED | OUTPATIENT
Start: 2019-08-13 | End: 2019-08-13 | Stop reason: HOSPADM

## 2019-08-13 RX ADMIN — PREDNISONE 40 MG: 20 TABLET ORAL at 10:48

## 2019-08-13 RX ADMIN — SODIUM CHLORIDE 75 ML/HR: 900 INJECTION, SOLUTION INTRAVENOUS at 06:12

## 2019-08-13 RX ADMIN — CIPROFLOXACIN 500 MG: 500 TABLET, FILM COATED ORAL at 10:48

## 2019-08-13 RX ADMIN — ENOXAPARIN SODIUM 40 MG: 40 INJECTION SUBCUTANEOUS at 10:45

## 2019-08-13 NOTE — PROGRESS NOTES
PCP ELIZABETH appt scheduled with NP Mock on 8/20/2019 at 1:20pm. Appt added to AVS. JUDY Emerson CM Specialist

## 2019-08-13 NOTE — PROGRESS NOTES
Bedside shift change report given to Kerri Krause (oncoming nurse) by Chantal Hankins (offgoing nurse). Report included the following information SBAR, Kardex and MAR.

## 2019-08-13 NOTE — DISCHARGE SUMMARY
Hospitalist Discharge Summary     Patient ID:  Kj Buchanan  [id number]  46 y.o.  1985 8/12/2019    PCP on record: Christina Clay MD    Admit date: 8/12/2019  Discharge date and time: 8/13/2019    DISCHARGE DIAGNOSIS:  Acute Crohns falre up    CONSULTATIONS:  IP CONSULT TO HOSPITALIST  IP CONSULT TO GASTROENTEROLOGY        ______________________________________________________________________  DISCHARGE SUMMARY/HOSPITAL COURSE:  for full details see H&P, daily progress notes, labs, consult notes. Patient admitted with acute Exacerbation of Crohn's disease with  Abdominal pain and  Nausea - POA. Followed by Dr Js Taylor as OP. She is on  Humira as OP. CT abdomen today showed thickening of the terminal ileum compatible with the history of Crohn's disease, with significant proximal ileal dilatation, also similar to the prior study. She was seen by GI and advised to go home on tapering steroids and cipro. She is feeling better today and cleared by GI team to go home.      _______________________________________________________________________  Patient seen and examined by me on discharge day. Spoke with GI ok to go home  And f/u as OP. Patient agreed and verbalized understanding of discharge plan and instructions. Pertinent Findings:  Gen:    Not in distress    _______________________________________________________________________  DISCHARGE MEDICATIONS:   Current Discharge Medication List      START taking these medications    Details   ciprofloxacin HCl (CIPRO) 500 mg tablet Take 1 Tab by mouth two (2) times a day for 7 days. Qty: 14 Tab, Refills: 0      predniSONE (DELTASONE) 20 mg tablet Take 20 mg by mouth Follow dosing instructions.  3 tabs twice daily 4 days  2 tabs twice daily 4 pizano  1tab twice daily 3 days  1 tab daily 3 days and stop  Qty: 55 Tab, Refills: 0         CONTINUE these medications which have NOT CHANGED    Details   !! OTHER,NON-FORMULARY, Take 1 Packet by mouth daily. Plexus Pink Drink      L.acid/L.casei/B.bif/B.sindy/FOS (PROBIOTIC BLEND PO) Take 1 Cap by mouth daily. adalimumab (HUMIRA) 40 mg/0.8 mL injection 40 mg by SubCUTAneous route every fourteen (14) days. Indications: Crohn's Disease      cholecalciferol (VITAMIN D3) 1,000 unit tablet Take 1,000 Units by mouth daily. !! OTHER,NON-FORMULARY, Take 1 Dose by mouth. Juice plus gummy      ondansetron (ZOFRAN ODT) 4 mg disintegrating tablet Take 1 Tab by mouth every eight (8) hours as needed for Nausea. Qty: 20 Tab, Refills: 0       !! - Potential duplicate medications found. Please discuss with provider.             Patient Follow Up Instructions:   See my discharge instructions note  Follow-up Information     Follow up With Specialties Details Why Contact Info    [address], Tina Wilkerson [facility]  [phone]          ________________________________________________________________      Condition at Discharge:  Stable  __________________________________________________________________    Disposition  home  ____________________________________________________________________        Total time in minutes spent coordinating this discharge 22 minutes    Signed:  Opal Lesches, MD

## 2019-08-13 NOTE — PROGRESS NOTES
CM Note:  Pt d/c'd to home today transported by her . No NN. She will f/u with her PCP.   PARRISH Platt

## 2019-08-13 NOTE — PROGRESS NOTES
Discharge information provided to patient and . Volunteer request placed. Paper discharge signature obtained due to E-signature not working. Education and discharge information provided. RN provided opportunity for further questions and education.

## 2019-08-13 NOTE — PROGRESS NOTES
[facility]. Lui Cardozo M.D.  [phone]                    GASTROENTEROLOGY CONSULTATION NOTE              NAME:  Agueda Jones   :   1985   MRN:   [id number]        Interval history: Tolerated full liquids well overnight. Slight pain but short lived. Wants to go home. Passing gas         Hospital Medications:  Current Facility-Administered Medications   Medication Dose Route Frequency    predniSONE (DELTASONE) tablet 40 mg  40 mg Oral DAILY WITH BREAKFAST    ciprofloxacin HCl (CIPRO) tablet 500 mg  500 mg Oral BID    sodium chloride (NS) flush 5-40 mL  5-40 mL IntraVENous Q8H    sodium chloride (NS) flush 5-40 mL  5-40 mL IntraVENous PRN    naloxone (NARCAN) injection 0.4 mg  0.4 mg IntraVENous PRN    acetaminophen (TYLENOL) tablet 650 mg  650 mg Oral Q4H PRN    HYDROcodone-acetaminophen (NORCO) 5-325 mg per tablet 1 Tab  1 Tab Oral Q4H PRN    diphenhydrAMINE (BENADRYL) capsule 25 mg  25 mg Oral Q4H PRN    ondansetron (ZOFRAN) injection 4 mg  4 mg IntraVENous Q4H PRN    enoxaparin (LOVENOX) injection 40 mg  40 mg SubCUTAneous Q24H    morphine injection 2 mg  2 mg IntraVENous Q4H PRN    aluminum-magnesium hydroxide (MAALOX) oral suspension 30 mL  30 mL Oral Q6H PRN         Objective:     Patient Vitals for the past 8 hrs:   BP Temp Pulse Resp SpO2   19 0757 118/67 98.6 °F (37 °C) 72 18 96 %   19 0459 125/77 97.8 °F (36.6 °C) 69 17 95 %     No intake/output data recorded. No intake/output data recorded. EXAM:     NEURO-alert, oriented x3, affect appropriate   HEENT-Head: Normocephalic, no lesions, without obvious abnormality. LUNGS-clear to auscultation bilaterally    COR-regular rate and rhythym     ABD- soft, non-tender.  Bowel sounds normal. No masses,  no organomegaly     EXT-no edema    Skin - No rash     Data Review     Recent Labs     19  0403 19  0317   WBC 13.6* 10.8   HGB 11.6 12.9   HCT 36.2 39.1    289     Recent Labs     08/13/19  0403 08/12/19 0317    142   K 3.6 3.7   * 110*   CO2 26 28   BUN 7 9   CREA 0.67 0.82   * 108*   CA 8.3* 9.2     Recent Labs     08/12/19 0317   SGOT 12*   AP 65   TP 7.9   ALB 3.6   GLOB 4.3*   LPSE 137     No results for input(s): INR, PTP, APTT in the last 72 hours. No lab exists for component: INREXT, INREXT    Patient Active Problem List   Diagnosis Code    Crohn's disease ([facility] [address] K50.90    Exacerbation of Crohn's disease ([facility] [address] K50.90       Assessment and Plan:  Crohns Disease: noted to have thickened TI on CT Scan    Switch to oral prednisone 40 mg daily and a short taper over the next 2 weeks. -  Continue Cipro for 7-10 day course  - Full liquid diet over the next 2 weeks  - Continue supportive care  - Awaiting humira levels and antibody levels.   -okay to go home and follow up with Dr. Valery Moran in 1-2 weeks        Signed By: David Valdes MD     8/13/2019  12:25 PM

## 2019-08-13 NOTE — DISCHARGE INSTRUCTIONS
Diet soft bland  Activity as before  Check cbc/bmp 1 week at pcp office  F/u PCP 1-2 weeks  F/u GI 1-2 weeks  Return to ER or call PCP immediately if symptoms gets worse or re-occur.

## 2019-09-16 LAB
Lab: NORMAL
REFERENCE LAB,REFLB: NORMAL
TEST DESCRIPTION:,ATST: NORMAL

## 2019-12-31 ENCOUNTER — HOSPITAL ENCOUNTER (EMERGENCY)
Age: 34
Discharge: HOME | End: 2019-12-31
Attending: STUDENT IN AN ORGANIZED HEALTH CARE EDUCATION/TRAINING PROGRAM
Payer: COMMERCIAL

## 2019-12-31 ENCOUNTER — APPOINTMENT (OUTPATIENT)
Dept: CT IMAGING | Age: 34
End: 2019-12-31
Attending: PHYSICIAN ASSISTANT
Payer: COMMERCIAL

## 2019-12-31 VITALS
SYSTOLIC BLOOD PRESSURE: 118 MMHG | HEIGHT: 71 IN | WEIGHT: 181 LBS | BODY MASS INDEX: 25.34 KG/M2 | RESPIRATION RATE: 14 BRPM | OXYGEN SATURATION: 100 % | HEART RATE: 77 BPM | DIASTOLIC BLOOD PRESSURE: 79 MMHG | TEMPERATURE: 98.1 F

## 2019-12-31 DIAGNOSIS — K50.919 EXACERBATION OF CROHN'S DISEASE WITH COMPLICATION (HCC): ICD-10-CM

## 2019-12-31 DIAGNOSIS — K56.600 PARTIAL SMALL BOWEL OBSTRUCTION (HCC): ICD-10-CM

## 2019-12-31 DIAGNOSIS — K50.918 EXACERBATION OF CROHN'S DISEASE WITH OTHER COMPLICATION (HCC): Primary | ICD-10-CM

## 2019-12-31 DIAGNOSIS — R10.31 ABDOMINAL PAIN, RLQ: ICD-10-CM

## 2019-12-31 LAB
ALBUMIN SERPL-MCNC: 4 G/DL (ref 3.5–5)
ALBUMIN/GLOB SERPL: 0.9 {RATIO} (ref 1.1–2.2)
ALP SERPL-CCNC: 63 U/L (ref 45–117)
ALT SERPL-CCNC: 17 U/L (ref 12–78)
ANION GAP SERPL CALC-SCNC: 7 MMOL/L (ref 5–15)
APPEARANCE UR: ABNORMAL
AST SERPL-CCNC: 14 U/L (ref 15–37)
BACTERIA URNS QL MICRO: NEGATIVE /HPF
BASOPHILS # BLD: 0 K/UL (ref 0–0.1)
BASOPHILS NFR BLD: 0 % (ref 0–1)
BILIRUB SERPL-MCNC: 0.2 MG/DL (ref 0.2–1)
BILIRUB UR QL: NEGATIVE
BUN SERPL-MCNC: 16 MG/DL (ref 6–20)
BUN/CREAT SERPL: 19 (ref 12–20)
CALCIUM SERPL-MCNC: 9 MG/DL (ref 8.5–10.1)
CHLORIDE SERPL-SCNC: 110 MMOL/L (ref 97–108)
CO2 SERPL-SCNC: 24 MMOL/L (ref 21–32)
COLOR UR: ABNORMAL
COMMENT, HOLDF: NORMAL
CREAT SERPL-MCNC: 0.84 MG/DL (ref 0.55–1.02)
DIFFERENTIAL METHOD BLD: ABNORMAL
EOSINOPHIL # BLD: 0.2 K/UL (ref 0–0.4)
EOSINOPHIL NFR BLD: 2 % (ref 0–7)
EPITH CASTS URNS QL MICRO: ABNORMAL /LPF
ERYTHROCYTE [DISTWIDTH] IN BLOOD BY AUTOMATED COUNT: 11.4 % (ref 11.5–14.5)
GLOBULIN SER CALC-MCNC: 4.4 G/DL (ref 2–4)
GLUCOSE SERPL-MCNC: 93 MG/DL (ref 65–100)
GLUCOSE UR STRIP.AUTO-MCNC: NEGATIVE MG/DL
HCG UR QL: NEGATIVE
HCT VFR BLD AUTO: 39.9 % (ref 35–47)
HGB BLD-MCNC: 13.4 G/DL (ref 11.5–16)
HGB UR QL STRIP: NEGATIVE
HYALINE CASTS URNS QL MICRO: ABNORMAL /LPF (ref 0–5)
IMM GRANULOCYTES # BLD AUTO: 0 K/UL (ref 0–0.04)
IMM GRANULOCYTES NFR BLD AUTO: 0 % (ref 0–0.5)
KETONES UR QL STRIP.AUTO: NEGATIVE MG/DL
LEUKOCYTE ESTERASE UR QL STRIP.AUTO: NEGATIVE
LYMPHOCYTES # BLD: 2.9 K/UL (ref 0.8–3.5)
LYMPHOCYTES NFR BLD: 28 % (ref 12–49)
MCH RBC QN AUTO: 30.2 PG (ref 26–34)
MCHC RBC AUTO-ENTMCNC: 33.6 G/DL (ref 30–36.5)
MCV RBC AUTO: 90.1 FL (ref 80–99)
MONOCYTES # BLD: 0.6 K/UL (ref 0–1)
MONOCYTES NFR BLD: 6 % (ref 5–13)
NEUTS SEG # BLD: 6.8 K/UL (ref 1.8–8)
NEUTS SEG NFR BLD: 64 % (ref 32–75)
NITRITE UR QL STRIP.AUTO: NEGATIVE
NRBC # BLD: 0 K/UL (ref 0–0.01)
NRBC BLD-RTO: 0 PER 100 WBC
PH UR STRIP: 7.5 [PH] (ref 5–8)
PLATELET # BLD AUTO: 271 K/UL (ref 150–400)
PMV BLD AUTO: 10.4 FL (ref 8.9–12.9)
POTASSIUM SERPL-SCNC: 3.7 MMOL/L (ref 3.5–5.1)
PROT SERPL-MCNC: 8.4 G/DL (ref 6.4–8.2)
PROT UR STRIP-MCNC: NEGATIVE MG/DL
RBC # BLD AUTO: 4.43 M/UL (ref 3.8–5.2)
RBC #/AREA URNS HPF: ABNORMAL /HPF (ref 0–5)
SAMPLES BEING HELD,HOLD: NORMAL
SODIUM SERPL-SCNC: 141 MMOL/L (ref 136–145)
SP GR UR REFRACTOMETRY: 1.01 (ref 1–1.03)
UR CULT HOLD, URHOLD: NORMAL
UROBILINOGEN UR QL STRIP.AUTO: 0.2 EU/DL (ref 0.2–1)
WBC # BLD AUTO: 10.6 K/UL (ref 3.6–11)
WBC URNS QL MICRO: ABNORMAL /HPF (ref 0–4)

## 2019-12-31 PROCEDURE — 36415 COLL VENOUS BLD VENIPUNCTURE: CPT

## 2019-12-31 PROCEDURE — 74011250636 HC RX REV CODE- 250/636: Performed by: EMERGENCY MEDICINE

## 2019-12-31 PROCEDURE — 74011636320 HC RX REV CODE- 636/320: Performed by: RADIOLOGY

## 2019-12-31 PROCEDURE — 85025 COMPLETE CBC W/AUTO DIFF WBC: CPT

## 2019-12-31 PROCEDURE — 74011250636 HC RX REV CODE- 250/636: Performed by: PHYSICIAN ASSISTANT

## 2019-12-31 PROCEDURE — 81001 URINALYSIS AUTO W/SCOPE: CPT

## 2019-12-31 PROCEDURE — 96374 THER/PROPH/DIAG INJ IV PUSH: CPT

## 2019-12-31 PROCEDURE — 74177 CT ABD & PELVIS W/CONTRAST: CPT

## 2019-12-31 PROCEDURE — 96375 TX/PRO/DX INJ NEW DRUG ADDON: CPT

## 2019-12-31 PROCEDURE — 80053 COMPREHEN METABOLIC PANEL: CPT

## 2019-12-31 PROCEDURE — 81025 URINE PREGNANCY TEST: CPT

## 2019-12-31 PROCEDURE — 99285 EMERGENCY DEPT VISIT HI MDM: CPT

## 2019-12-31 RX ORDER — MORPHINE SULFATE 4 MG/ML
4 INJECTION INTRAVENOUS
Status: COMPLETED | OUTPATIENT
Start: 2019-12-31 | End: 2019-12-31

## 2019-12-31 RX ORDER — TRAMADOL HYDROCHLORIDE 50 MG/1
50 TABLET ORAL
Qty: 12 TAB | Refills: 0 | Status: SHIPPED | OUTPATIENT
Start: 2019-12-31 | End: 2020-01-03

## 2019-12-31 RX ORDER — PROCHLORPERAZINE EDISYLATE 5 MG/ML
10 INJECTION INTRAMUSCULAR; INTRAVENOUS ONCE
Status: COMPLETED | OUTPATIENT
Start: 2019-12-31 | End: 2019-12-31

## 2019-12-31 RX ORDER — PREDNISONE 10 MG/1
TABLET ORAL
Qty: 100 TAB | Refills: 0 | Status: SHIPPED | OUTPATIENT
Start: 2019-12-31 | End: 2020-02-10

## 2019-12-31 RX ORDER — ONDANSETRON 4 MG/1
4 TABLET, ORALLY DISINTEGRATING ORAL
Qty: 12 TAB | Refills: 0 | Status: ON HOLD | OUTPATIENT
Start: 2019-12-31 | End: 2020-02-26

## 2019-12-31 RX ADMIN — METHYLPREDNISOLONE SODIUM SUCCINATE 125 MG: 125 INJECTION, POWDER, FOR SOLUTION INTRAMUSCULAR; INTRAVENOUS at 23:10

## 2019-12-31 RX ADMIN — SODIUM CHLORIDE 1000 ML: 900 INJECTION, SOLUTION INTRAVENOUS at 20:57

## 2019-12-31 RX ADMIN — PROCHLORPERAZINE EDISYLATE 10 MG: 5 INJECTION INTRAMUSCULAR; INTRAVENOUS at 21:03

## 2019-12-31 RX ADMIN — MORPHINE SULFATE 4 MG: 4 INJECTION INTRAVENOUS at 21:01

## 2019-12-31 RX ADMIN — IOPAMIDOL 100 ML: 755 INJECTION, SOLUTION INTRAVENOUS at 21:36

## 2020-01-01 NOTE — DISCHARGE INSTRUCTIONS
- Prednisone taper as prescribed. Begin by 8 AM.  - Clear liquid diet for 1/1 and 1/2. You may then advance your diet as tolerated. - You may take Acetaminophen for pain. You may take Tramadol for continued pain. - Zofran as needed for nausea. - Call Dr. Albert Webb office on Friday and let him know you were seen in the ED and have a partial small bowel obstruction. Follow-up as advised. - Return to ED for fever, increased pain, vomiting, concern for dehydration, any other concerns. Patient Education        Crohn's Disease: Care Instructions  Your Care Instructions    Crohn's disease is a lifelong inflammatory bowel disease (IBD). Parts of the digestive tract get swollen and irritated and may develop deep sores called ulcers. Crohn's disease usually occurs in the last part of the small intestine and the first part of the large intestine. But it can develop anywhere from the mouth to the anus. The main symptoms of Crohn's disease are belly pain, diarrhea, fever, and weight loss. Some people may have constipation. Crohn's disease also sometimes causes problems with the joints, eyes, or skin. Your symptoms may be mild at some times and severe at others. The disease can also go into remission, which means that it is not active and you have no symptoms. Bad attacks of Crohn's disease often have to be treated in the hospital so that you can get medicines and liquids through a tube in your vein, called an IV. This gives your digestive system time to rest and recover. Talk with your doctor about the best treatments for you. You may need medicines that help prevent or treat flare-ups of the disease. You may need surgery to remove part of your bowel if you have an abnormal opening in the bowel (fistula), an abscess, or a bowel obstruction. In some cases, surgery is needed if medicines do not work. But symptoms often return to other areas of the intestines after surgery.   Learning good self-care can help you reduce your symptoms and manage Crohn's disease. Follow-up care is a key part of your treatment and safety. Be sure to make and go to all appointments, and call your doctor if you are having problems. It's also a good idea to know your test results and keep a list of the medicines you take. How can you care for yourself at home? · Take your medicines exactly as prescribed. Call your doctor if you think you are having a problem with your medicine. You will get more details on the specific medicines your doctor prescribes. · Do not take anti-inflammatory medicines, such as aspirin, ibuprofen (Advil, Motrin), or naproxen (Aleve). They may make your symptoms worse. Do not take any other medicines or herbal products without talking to your doctor first.  · Avoid foods that make your symptoms worse. These might include milk, alcohol, high-fiber foods, or spicy foods. · Eat a healthy diet. Make sure to get enough iron. Rectal bleeding may make you lose iron. Good sources of iron include beef, lentils, spinach, raisins, and iron-enriched breads and cereals. · Drink liquid meal replacements if your doctor recommends them. These are high in calories and contain vitamins and minerals. Severe symptoms may make it hard for your body to absorb vitamins and minerals. · Do not smoke. Smoking makes Crohn's disease worse. If you need help quitting, talk to your doctor about stop-smoking programs and medicines. These can increase your chances of quitting for good. · Seek support from friends and family to help cope with Crohn's disease. The illness can affect all parts of your life. Get counseling if you need it. When should you call for help? Call 911 anytime you think you may need emergency care.  For example, call if:    · Your stools are maroon or very bloody.     · You passed out (lost consciousness).    Call your doctor now or seek immediate medical care if:    · You are vomiting.     · You have new or worse belly pain.     · You have a fever.     · You cannot pass stools or gas.     · You have new or more blood in your stools.    Watch closely for changes in your health, and be sure to contact your doctor if:    · You have new or worse symptoms.     · You are losing weight.     · You do not get better as expected. Where can you learn more? Go to http://lul-malick.info/. Enter 21 [id number] in the search box to learn more about \"Crohn's Disease: Care Instructions. \"  Current as of: November 7, 2018  Content Version: 12.2  © 0101-6999 [employer]. Care instructions adapted under license by [employer] (which disclaims liability or warranty for this information). If you have questions about a medical condition or this instruction, always ask your healthcare professional. [address] any warranty or liability for your use of this information. Patient Education        Bowel Blockage (Intestinal Obstruction): Care Instructions  Your Care Instructions  A bowel blockage, also called an intestinal obstruction, can prevent gas, fluids, or solids from moving through the intestines normally. It can cause constipation and, rarely, diarrhea. You may have pain, nausea, vomiting, and cramping. Most of the time, complete blockages require a stay in the hospital and possibly surgery. But if your bowel is only partly blocked, your doctor may tell you to wait until it clears on its own and you are able to pass gas and stool. If so, there are things you can do at home to help make you feel better. If you have had surgery for a bowel blockage, there are things you can do at home to make sure you heal well. You can also make some changes to keep your bowel from becoming blocked again. Follow-up care is a key part of your treatment and safety. Be sure to make and go to all appointments, and call your doctor if you are having problems.  It's also a good idea to know your test results and keep a list of the medicines you take. How can you care for yourself at home? If your doctor has told you to wait at home for a blockage to clear on its own:  · Follow your doctor's instructions. These may include eating a liquid diet to avoid complete blockage. · Be safe with medicines. Take your medicines exactly as prescribed. Call your doctor if you think you are having a problem with your medicine. · Put a heating pad set on low on your belly to relieve mild cramps and pain. To prevent another blockage  · Try to eat smaller amounts of food more often. For example, have 5 or 6 small meals throughout the day instead of 2 or 3 large meals. · Chew your food very well. Try to chew each bite about 20 times or until it is liquid. · Avoid high-fiber foods and raw fruits and vegetables with skins, husks, strings, or seeds. These can form a ball of undigested material that can cause a blockage if a part of your bowel is scarred or narrowed. · Check with your doctor before you eat whole-grain products or use a fiber supplement such as Citrucel or Metamucil. · To help you have regular bowel movements, eat at regular times, do not strain during a bowel movement, and drink at least 8 to 10 glasses of water each day. If you have kidney, heart, or liver disease and have to limit fluids, talk with your doctor or before you increase the amount of fluids you drink. · Drink high-calorie liquid formulas if your doctor says to. Severe symptoms may make it hard for your body to take in vitamins and minerals. · Get regular exercise. It helps you digest your food better. Get at least 30 minutes of physical activity on most days of the week. Walking is a good choice. When should you call for help?   Call your doctor now or seek immediate medical care if:    · You have a fever.     · You are vomiting.     · You have new or worse belly pain.     · You cannot pass stools or gas.    Watch closely for changes in your health, and be sure to contact your doctor if you have any problems. Where can you learn more? Go to http://lul-malick.info/. Enter O016 in the search box to learn more about \"Bowel Blockage (Intestinal Obstruction): Care Instructions. \"  Current as of: November 7, 2018  Content Version: 12.2  © 7190-6708 [employer]. Care instructions adapted under license by [employer] (which disclaims liability or warranty for this information). If you have questions about a medical condition or this instruction, always ask your healthcare professional. [address] any warranty or liability for your use of this information.

## 2020-01-01 NOTE — ED NOTES
Patient seen by provider prior to discharge, given discharge papers by RN. Patient with no further questions. Ambulatory to home and no apparent distress.

## 2020-01-01 NOTE — ED PROVIDER NOTES
8:20 PM  I have evaluated the patient as the Provider in Triage. I have reviewed Her vital signs and the triage nurse assessment. I have talked with the patient and any available family and advised that I am the provider in triage and have ordered the appropriate study to initiate their work up based on the clinical presentation during my assessment. I have advised that the patient will be accommodated in the Main ED as soon as possible. I have also requested to contact the triage nurse or myself immediately if the patient experiences any changes in their condition during this brief waiting period. DAYNA Collins    --------------------------------------    37 y.o. female with past medical history significant for Crohn's disease who presents from home via personal vehicle with chief complaint of abdominal pain. Patient presents with intermittent sharp RLQ abdominal pain and nausea since about 1720 tonight 12/31/19. Notes pain radiation to right flank and notes at rest her abdominal pain is a 4/10 but an 8/10 when it hits her. Also notes her urine is cloudy. Patient with history of crohns and states it feels like she is having a flare. States whenever she has a Crohn's flare up it is \"always in the same place and same spot\". First diagnosed with Crohn's in 2017. Is followed by Jorge Rojas MD for GI. Recently started remicade infusions and notes her next infusion is in approximately 8 weeks. Tried zofran tonight with no relief. Pt denies emesis, diarrhea, fever, urinary symptoms, or any hx of appendectomy. There are no other acute medical concerns at this time. Surgical hx - unknown   Social hx - Tobacco use: non smoker, Alcohol Use: socially, Drug Use: denies    PCP: Owen Quintanilla MD    Note written by Kimi Ibrahim, as dictated by Jose Enrique Ortez MD 9:05PM.            The history is provided by the patient and the spouse. No  was used.         Past Medical History:   Diagnosis Date    Fatigue     Headache        Past Surgical History:   Procedure Laterality Date    HX HEENT           Family History:   Problem Relation Age of Onset    Cancer Maternal Grandmother     Headache Maternal Grandmother     Cancer Paternal Grandmother     Cancer Other        Social History     Socioeconomic History    Marital status:      Spouse name: Not on file    Number of children: Not on file    Years of education: Not on file    Highest education level: Not on file   Occupational History    Not on file   Social Needs    Financial resource strain: Not on file    Food insecurity:     Worry: Not on file     Inability: Not on file    Transportation needs:     Medical: Not on file     Non-medical: Not on file   Tobacco Use    Smoking status: Never Smoker    Smokeless tobacco: Never Used   Substance and Sexual Activity    Alcohol use: Yes     Comment: socially    Drug use: No    Sexual activity: Not on file   Lifestyle    Physical activity:     Days per week: Not on file     Minutes per session: Not on file    Stress: Not on file   Relationships    Social connections:     Talks on phone: Not on file     Gets together: Not on file     Attends [employer] service: Not on file     Active member of club or organization: Not on file     Attends meetings of clubs or organizations: Not on file     Relationship status: Not on file    Intimate partner violence:     Fear of current or ex partner: Not on file     Emotionally abused: Not on file     Physically abused: Not on file     Forced sexual activity: Not on file   Other Topics Concern    Not on file   Social History Narrative    Not on file         ALLERGIES: Patient has no known allergies. Review of Systems   Constitutional: Negative for activity change, diaphoresis, fatigue and fever. HENT: Negative for congestion and sore throat. Eyes: Negative for photophobia and visual disturbance.    Respiratory: Negative for chest tightness and shortness of breath. Cardiovascular: Negative for chest pain, palpitations and leg swelling. Gastrointestinal: Positive for abdominal pain and nausea. Negative for blood in stool, constipation, diarrhea and vomiting. Genitourinary: Positive for flank pain. Negative for decreased urine volume, difficulty urinating, dysuria, enuresis, frequency, hematuria and urgency. Musculoskeletal: Negative for back pain. Neurological: Negative for dizziness, syncope, numbness and headaches. All other systems reviewed and are negative. There were no vitals filed for this visit. Physical Exam  Vitals signs and nursing note reviewed. Constitutional:       General: She is not in acute distress. Appearance: She is well-developed. She is not diaphoretic. HENT:      Head: Normocephalic and atraumatic. Nose: Nose normal.      Mouth/Throat:      Pharynx: No oropharyngeal exudate. Eyes:      General: No scleral icterus. Right eye: No discharge. Left eye: No discharge. Conjunctiva/sclera: Conjunctivae normal.   Neck:      Musculoskeletal: Normal range of motion and neck supple. Thyroid: No thyromegaly. Vascular: No JVD. Trachea: No tracheal deviation. Cardiovascular:      Rate and Rhythm: Normal rate and regular rhythm. Heart sounds: Normal heart sounds. No murmur. No friction rub. No gallop. Pulmonary:      Effort: Pulmonary effort is normal. No respiratory distress. Breath sounds: Normal breath sounds. No stridor. No wheezing or rales. Chest:      Chest wall: No tenderness. Abdominal:      General: Bowel sounds are normal. There is no distension. Palpations: There is no mass. Tenderness: There is tenderness in the right lower quadrant. There is no rebound. Musculoskeletal: Normal range of motion. General: No tenderness. Lymphadenopathy:      Cervical: No cervical adenopathy.    Skin: General: Skin is warm and dry. Coloration: Skin is not pale. Findings: No erythema or rash. Neurological:      Mental Status: She is alert and oriented to person, place, and time. Cranial Nerves: No cranial nerve deficit. Coordination: Coordination normal.   Psychiatric:         Behavior: Behavior normal.         Thought Content:  Thought content normal.         Judgment: Judgment normal.     Note written by Kimi Medina, as dictated by Mann Nj MD 9:05 PM     MDM       Procedures

## 2020-01-01 NOTE — ED NOTES
10: 03PM  Change of shift. Care of patient taken over from Dr. Kelsy Cortse to Dr. Ignacio Rosales; H&P reviewed, bedside handoff complete. CONSULT NOTE:  10:51 PM Wilfrido Cadena MD spoke with Dr. Kyle Land for Gastroenterology. Discussed available diagnostic tests and clinical findings. Dr. Lazaro Argueta states that the pt can go home with clear liquid diet for next 2 days and recommends administration of 125mg solumedrol prior to discharge. Dr. Lazaro Argueta also recommends starting the pt on a prednisone taper tomorrow 1/1/20.  For prednisone taper specifies that the pt should receive 40 mg for 10 days, 30 mg for 10 days, 20 mg for 10 days, and lastly 10 mg for 10

## 2020-02-10 ENCOUNTER — HOSPITAL ENCOUNTER (OUTPATIENT)
Dept: PREADMISSION TESTING | Age: 35
Discharge: HOME | End: 2020-02-10
Payer: COMMERCIAL

## 2020-02-10 ENCOUNTER — HOSPITAL ENCOUNTER (OUTPATIENT)
Dept: GENERAL RADIOLOGY | Age: 35
Discharge: HOME | End: 2020-02-10
Attending: COLON & RECTAL SURGERY
Payer: COMMERCIAL

## 2020-02-10 VITALS
RESPIRATION RATE: 18 BRPM | TEMPERATURE: 98 F | HEART RATE: 72 BPM | WEIGHT: 190.7 LBS | DIASTOLIC BLOOD PRESSURE: 90 MMHG | SYSTOLIC BLOOD PRESSURE: 144 MMHG | BODY MASS INDEX: 26.7 KG/M2 | OXYGEN SATURATION: 100 % | HEIGHT: 71 IN

## 2020-02-10 LAB
ALBUMIN SERPL-MCNC: 3.7 G/DL (ref 3.5–5)
ALBUMIN/GLOB SERPL: 0.9 {RATIO} (ref 1.1–2.2)
ALP SERPL-CCNC: 63 U/L (ref 45–117)
ALT SERPL-CCNC: 16 U/L (ref 12–78)
ANION GAP SERPL CALC-SCNC: 8 MMOL/L (ref 5–15)
AST SERPL-CCNC: 10 U/L (ref 15–37)
ATRIAL RATE: 63 BPM
BASOPHILS # BLD: 0.1 K/UL (ref 0–0.1)
BASOPHILS NFR BLD: 1 % (ref 0–1)
BILIRUB SERPL-MCNC: 0.5 MG/DL (ref 0.2–1)
BUN SERPL-MCNC: 12 MG/DL (ref 6–20)
BUN/CREAT SERPL: 16 (ref 12–20)
CALCIUM SERPL-MCNC: 8.5 MG/DL (ref 8.5–10.1)
CALCULATED P AXIS, ECG09: 35 DEGREES
CALCULATED R AXIS, ECG10: 26 DEGREES
CALCULATED T AXIS, ECG11: 29 DEGREES
CHLORIDE SERPL-SCNC: 110 MMOL/L (ref 97–108)
CO2 SERPL-SCNC: 23 MMOL/L (ref 21–32)
CREAT SERPL-MCNC: 0.74 MG/DL (ref 0.55–1.02)
DIAGNOSIS, 93000: NORMAL
DIFFERENTIAL METHOD BLD: NORMAL
EOSINOPHIL # BLD: 0.2 K/UL (ref 0–0.4)
EOSINOPHIL NFR BLD: 2 % (ref 0–7)
ERYTHROCYTE [DISTWIDTH] IN BLOOD BY AUTOMATED COUNT: 12.2 % (ref 11.5–14.5)
GLOBULIN SER CALC-MCNC: 3.9 G/DL (ref 2–4)
GLUCOSE SERPL-MCNC: 89 MG/DL (ref 65–100)
HCT VFR BLD AUTO: 38.8 % (ref 35–47)
HGB BLD-MCNC: 13 G/DL (ref 11.5–16)
IMM GRANULOCYTES # BLD AUTO: 0 K/UL (ref 0–0.04)
IMM GRANULOCYTES NFR BLD AUTO: 0 % (ref 0–0.5)
LYMPHOCYTES # BLD: 2.5 K/UL (ref 0.8–3.5)
LYMPHOCYTES NFR BLD: 35 % (ref 12–49)
MCH RBC QN AUTO: 30.4 PG (ref 26–34)
MCHC RBC AUTO-ENTMCNC: 33.5 G/DL (ref 30–36.5)
MCV RBC AUTO: 90.7 FL (ref 80–99)
MONOCYTES # BLD: 0.5 K/UL (ref 0–1)
MONOCYTES NFR BLD: 8 % (ref 5–13)
NEUTS SEG # BLD: 3.8 K/UL (ref 1.8–8)
NEUTS SEG NFR BLD: 54 % (ref 32–75)
NRBC # BLD: 0 K/UL (ref 0–0.01)
NRBC BLD-RTO: 0 PER 100 WBC
P-R INTERVAL, ECG05: 152 MS
PLATELET # BLD AUTO: 293 K/UL (ref 150–400)
PMV BLD AUTO: 10.4 FL (ref 8.9–12.9)
POTASSIUM SERPL-SCNC: 4.1 MMOL/L (ref 3.5–5.1)
PROT SERPL-MCNC: 7.6 G/DL (ref 6.4–8.2)
Q-T INTERVAL, ECG07: 416 MS
QRS DURATION, ECG06: 92 MS
QTC CALCULATION (BEZET), ECG08: 425 MS
RBC # BLD AUTO: 4.28 M/UL (ref 3.8–5.2)
SODIUM SERPL-SCNC: 141 MMOL/L (ref 136–145)
VENTRICULAR RATE, ECG03: 63 BPM
WBC # BLD AUTO: 7 K/UL (ref 3.6–11)

## 2020-02-10 PROCEDURE — 36415 COLL VENOUS BLD VENIPUNCTURE: CPT

## 2020-02-10 PROCEDURE — 71046 X-RAY EXAM CHEST 2 VIEWS: CPT

## 2020-02-10 PROCEDURE — 80053 COMPREHEN METABOLIC PANEL: CPT

## 2020-02-10 PROCEDURE — 86923 COMPATIBILITY TEST ELECTRIC: CPT

## 2020-02-10 PROCEDURE — 86900 BLOOD TYPING SEROLOGIC ABO: CPT

## 2020-02-10 PROCEDURE — 93005 ELECTROCARDIOGRAM TRACING: CPT

## 2020-02-10 PROCEDURE — 85025 COMPLETE CBC W/AUTO DIFF WBC: CPT

## 2020-02-10 RX ORDER — SODIUM CHLORIDE 9 MG/ML
250 INJECTION, SOLUTION INTRAVENOUS AS NEEDED
Status: DISCONTINUED | OUTPATIENT
Start: 2020-02-10 | End: 2020-02-11 | Stop reason: HOSPADM

## 2020-02-10 RX ORDER — MONTELUKAST SODIUM 10 MG/1
10 TABLET ORAL DAILY
COMMUNITY

## 2020-02-10 RX ORDER — TOPIRAMATE 50 MG/1
100 TABLET, FILM COATED ORAL EVERY EVENING
COMMUNITY

## 2020-02-10 RX ORDER — SODIUM CHLORIDE 9 MG/ML
250 INJECTION, SOLUTION INTRAVENOUS AS NEEDED
Status: CANCELLED | OUTPATIENT
Start: 2020-02-10

## 2020-02-10 NOTE — PERIOP NOTES
[address] [place] [address] Dr Leyla Mancuso [address], [address] [facility]   MAIN OR                                  [phone]   MAIN PRE OP                          [phone]                                                                                AMBULATORY PRE OP          [phone]  PRE-ADMISSION TESTING    [phone]   Surgery Date:   2/18/20       Is surgery arrival time given by surgeon? no  If NO, [facility] staff will call you between 3 and 7pm the day before your surgery with your arrival time. (If your surgery is on a Monday, we will call you the Friday before.)    Call [phone] after 7pm Monday-Friday if you did not receive this call. INSTRUCTIONS BEFORE YOUR SURGERY   When You  Arrive Arrive at the 2nd [facility] on the day of your surgery  Have your insurance card, photo ID, and any copayment (if needed)   Food   and   Drink NO food or drink after midnight the night before surgery    This means NO water, gum, mints, coffee, juice, etc.  No alcohol (beer, wine, liquor) 24 hours before and after surgery   Medications to   TAKE   Morning of Surgery MEDICATIONS TO TAKE THE MORNING OF SURGERY WITH A SIP OF WATER:    singular   Medications  To  STOP      7 days before surgery  Non-Steroidal anti-inflammatory Drugs (NSAID's): for example, Ibuprofen (Advil, Motrin), Naproxen (Aleve)   Aspirin, if taking for pain    Herbal supplements, vitamins, and fish oil   Other:  (Pain medications not listed above, including Tylenol may be taken)   Blood  Thinners  If you take  Aspirin, Plavix, Coumadin, or any blood-thinning or anti-blood clot medicine, talk to the doctor who prescribed the medications for pre-operative instructions.    Bathing Clothing  Jewelry  Valuables      If you shower the morning of surgery, please do not apply anything to your skin (lotions, powders, deodorant, or makeup, especially mascara)   Follow Chlorhexidine Care Fusion body wash instructions provided to you during PAT appointment. Begin 3 days prior to surgery.  Do not shave or trim anywhere 24 hours before surgery   Wear your hair loose or down; no pony-tails, buns, or metal hair clips   Wear loose, comfortable, clean clothes   Wear glasses instead of contacts   Leave money, valuables, and jewelry, including body piercings, at home   Going Home - or Spending the Night  SAME-DAY SURGERY: You must have a responsible adult drive you home and stay with you 24 hours after surgery   ADMITS: If your doctor is keeping you in the hospital after surgery, leave personal belongings/luggage in your car until you have a hospital room number. Hospital discharge time is 12 noon  Drivers must be here before 12 noon unless you are told differently   Special Instructions       Follow all instructions so your surgery wont be cancelled. Please, be on time. If a situation occurs and you are delayed the day of surgery, call [phone] or [phone]. If your physical condition changes (like a fever, cold, flu, etc.) call your surgeon. Home medication(s) reviewed and verified in person during PAT appointment. The patient was contacted  in person. The patient verbalizes understanding of all instructions and does not  need reinforcement.

## 2020-02-17 ENCOUNTER — ANESTHESIA EVENT (OUTPATIENT)
Dept: SURGERY | Age: 35
DRG: 331 | End: 2020-02-17
Payer: COMMERCIAL

## 2020-02-18 ENCOUNTER — HOSPITAL ENCOUNTER (INPATIENT)
Age: 35
LOS: 4 days | Discharge: HOME | DRG: 331 | End: 2020-02-22
Attending: COLON & RECTAL SURGERY | Admitting: COLON & RECTAL SURGERY
Payer: COMMERCIAL

## 2020-02-18 ENCOUNTER — ANESTHESIA (OUTPATIENT)
Dept: SURGERY | Age: 35
DRG: 331 | End: 2020-02-18
Payer: COMMERCIAL

## 2020-02-18 DIAGNOSIS — Z90.49 S/P COLON RESECTION: Primary | ICD-10-CM

## 2020-02-18 PROBLEM — K50.118: Status: ACTIVE | Noted: 2020-02-18

## 2020-02-18 LAB
ANION GAP SERPL CALC-SCNC: 6 MMOL/L (ref 5–15)
BASOPHILS # BLD: 0 K/UL (ref 0–0.1)
BASOPHILS NFR BLD: 0 % (ref 0–1)
BUN SERPL-MCNC: 8 MG/DL (ref 6–20)
BUN/CREAT SERPL: 9 (ref 12–20)
CALCIUM SERPL-MCNC: 8 MG/DL (ref 8.5–10.1)
CHLORIDE SERPL-SCNC: 111 MMOL/L (ref 97–108)
CO2 SERPL-SCNC: 23 MMOL/L (ref 21–32)
CREAT SERPL-MCNC: 0.85 MG/DL (ref 0.55–1.02)
DIFFERENTIAL METHOD BLD: ABNORMAL
EOSINOPHIL # BLD: 0 K/UL (ref 0–0.4)
EOSINOPHIL NFR BLD: 0 % (ref 0–7)
ERYTHROCYTE [DISTWIDTH] IN BLOOD BY AUTOMATED COUNT: 12 % (ref 11.5–14.5)
GLUCOSE SERPL-MCNC: 148 MG/DL (ref 65–100)
HCG UR QL: NEGATIVE
HCT VFR BLD AUTO: 34 % (ref 35–47)
HGB BLD-MCNC: 11.4 G/DL (ref 11.5–16)
IMM GRANULOCYTES # BLD AUTO: 0.1 K/UL (ref 0–0.04)
IMM GRANULOCYTES NFR BLD AUTO: 1 % (ref 0–0.5)
LYMPHOCYTES # BLD: 1.2 K/UL (ref 0.8–3.5)
LYMPHOCYTES NFR BLD: 8 % (ref 12–49)
MCH RBC QN AUTO: 30.6 PG (ref 26–34)
MCHC RBC AUTO-ENTMCNC: 33.5 G/DL (ref 30–36.5)
MCV RBC AUTO: 91.2 FL (ref 80–99)
MONOCYTES # BLD: 0.3 K/UL (ref 0–1)
MONOCYTES NFR BLD: 2 % (ref 5–13)
NEUTS SEG # BLD: 14.4 K/UL (ref 1.8–8)
NEUTS SEG NFR BLD: 89 % (ref 32–75)
NRBC # BLD: 0 K/UL (ref 0–0.01)
NRBC BLD-RTO: 0 PER 100 WBC
PLATELET # BLD AUTO: 246 K/UL (ref 150–400)
PMV BLD AUTO: 10.3 FL (ref 8.9–12.9)
POTASSIUM SERPL-SCNC: 3.4 MMOL/L (ref 3.5–5.1)
RBC # BLD AUTO: 3.73 M/UL (ref 3.8–5.2)
SODIUM SERPL-SCNC: 140 MMOL/L (ref 136–145)
WBC # BLD AUTO: 16 K/UL (ref 3.6–11)

## 2020-02-18 PROCEDURE — 0DTH0ZZ RESECTION OF CECUM, OPEN APPROACH: ICD-10-PCS | Performed by: COLON & RECTAL SURGERY

## 2020-02-18 PROCEDURE — C9290 INJ, BUPIVACAINE LIPOSOME: HCPCS | Performed by: COLON & RECTAL SURGERY

## 2020-02-18 PROCEDURE — 77030009527 HC GEL PRT SYS AMR -E: Performed by: COLON & RECTAL SURGERY

## 2020-02-18 PROCEDURE — 77030036731 HC STPLR ENDOSC J&J -F: Performed by: COLON & RECTAL SURGERY

## 2020-02-18 PROCEDURE — 77030020255 HC SOL INJ LR 1000ML BG: Performed by: COLON & RECTAL SURGERY

## 2020-02-18 PROCEDURE — 88307 TISSUE EXAM BY PATHOLOGIST: CPT

## 2020-02-18 PROCEDURE — 77030013079 HC BLNKT BAIR HGGR 3M -A: Performed by: NURSE ANESTHETIST, CERTIFIED REGISTERED

## 2020-02-18 PROCEDURE — 77030002996 HC SUT SLK J&J -A: Performed by: COLON & RECTAL SURGERY

## 2020-02-18 PROCEDURE — 77030040922 HC BLNKT HYPOTHRM STRY -A: Performed by: COLON & RECTAL SURGERY

## 2020-02-18 PROCEDURE — 74011250636 HC RX REV CODE- 250/636: Performed by: NURSE ANESTHETIST, CERTIFIED REGISTERED

## 2020-02-18 PROCEDURE — 77030011640 HC PAD GRND REM COVD -A: Performed by: COLON & RECTAL SURGERY

## 2020-02-18 PROCEDURE — 85025 COMPLETE CBC W/AUTO DIFF WBC: CPT

## 2020-02-18 PROCEDURE — 74011250637 HC RX REV CODE- 250/637: Performed by: ANESTHESIOLOGY

## 2020-02-18 PROCEDURE — 77030002933 HC SUT MCRYL J&J -A: Performed by: COLON & RECTAL SURGERY

## 2020-02-18 PROCEDURE — 76210000016 HC OR PH I REC 1 TO 1.5 HR: Performed by: COLON & RECTAL SURGERY

## 2020-02-18 PROCEDURE — C1765 ADHESION BARRIER: HCPCS | Performed by: COLON & RECTAL SURGERY

## 2020-02-18 PROCEDURE — 81025 URINE PREGNANCY TEST: CPT

## 2020-02-18 PROCEDURE — 74011000250 HC RX REV CODE- 250: Performed by: NURSE ANESTHETIST, CERTIFIED REGISTERED

## 2020-02-18 PROCEDURE — 74011000250 HC RX REV CODE- 250: Performed by: COLON & RECTAL SURGERY

## 2020-02-18 PROCEDURE — 77030018673: Performed by: COLON & RECTAL SURGERY

## 2020-02-18 PROCEDURE — 65270000029 HC RM PRIVATE

## 2020-02-18 PROCEDURE — 77030008517 HC TBNG INSUF ENDO STOR -B: Performed by: COLON & RECTAL SURGERY

## 2020-02-18 PROCEDURE — 77030008603 HC TRCR ENDOSC EPATH J&J -C: Performed by: COLON & RECTAL SURGERY

## 2020-02-18 PROCEDURE — 77030011265 HC ELECTRD BLD HEX COVD -A: Performed by: COLON & RECTAL SURGERY

## 2020-02-18 PROCEDURE — 77030002966 HC SUT PDS J&J -A: Performed by: COLON & RECTAL SURGERY

## 2020-02-18 PROCEDURE — 76060000037 HC ANESTHESIA 3 TO 3.5 HR: Performed by: COLON & RECTAL SURGERY

## 2020-02-18 PROCEDURE — 74011000258 HC RX REV CODE- 258: Performed by: COLON & RECTAL SURGERY

## 2020-02-18 PROCEDURE — 77030031139 HC SUT VCRL2 J&J -A: Performed by: COLON & RECTAL SURGERY

## 2020-02-18 PROCEDURE — 76010000133 HC OR TIME 3 TO 3.5 HR: Performed by: COLON & RECTAL SURGERY

## 2020-02-18 PROCEDURE — 77030008684 HC TU ET CUF COVD -B: Performed by: NURSE ANESTHETIST, CERTIFIED REGISTERED

## 2020-02-18 PROCEDURE — 74011250636 HC RX REV CODE- 250/636: Performed by: COLON & RECTAL SURGERY

## 2020-02-18 PROCEDURE — 77030027876 HC STPLR ENDOSC FLX PWR J&J -G1: Performed by: COLON & RECTAL SURGERY

## 2020-02-18 PROCEDURE — 77030020829: Performed by: COLON & RECTAL SURGERY

## 2020-02-18 PROCEDURE — 74011250637 HC RX REV CODE- 250/637: Performed by: COLON & RECTAL SURGERY

## 2020-02-18 PROCEDURE — 77030018836 HC SOL IRR NACL ICUM -A: Performed by: COLON & RECTAL SURGERY

## 2020-02-18 PROCEDURE — 77030026438 HC STYL ET INTUB CARD -A: Performed by: NURSE ANESTHETIST, CERTIFIED REGISTERED

## 2020-02-18 PROCEDURE — 77030012890

## 2020-02-18 PROCEDURE — 80048 BASIC METABOLIC PNL TOTAL CA: CPT

## 2020-02-18 PROCEDURE — 77030040504 HC DRN WND MDII -B: Performed by: COLON & RECTAL SURGERY

## 2020-02-18 PROCEDURE — 77030008756 HC TU IRR SUC STRY -B: Performed by: COLON & RECTAL SURGERY

## 2020-02-18 PROCEDURE — 77030010507 HC ADH SKN DERMBND J&J -B: Performed by: COLON & RECTAL SURGERY

## 2020-02-18 PROCEDURE — 77030008771 HC TU NG SALEM SUMP -A: Performed by: NURSE ANESTHETIST, CERTIFIED REGISTERED

## 2020-02-18 PROCEDURE — 77030040506 HC DRN WND MDII -A: Performed by: COLON & RECTAL SURGERY

## 2020-02-18 PROCEDURE — 77030040922 HC BLNKT HYPOTHRM STRY -A

## 2020-02-18 PROCEDURE — 77030025625 HC DEV TISS SEAL J&J -F: Performed by: COLON & RECTAL SURGERY

## 2020-02-18 PROCEDURE — 77030040361 HC SLV COMPR DVT MDII -B

## 2020-02-18 PROCEDURE — 74011250636 HC RX REV CODE- 250/636: Performed by: ANESTHESIOLOGY

## 2020-02-18 PROCEDURE — 77030016151 HC PROTCTR LNS DFOG COVD -B: Performed by: COLON & RECTAL SURGERY

## 2020-02-18 PROCEDURE — 77030008602 HC TRCR ENDOSC EPATH J&J -B: Performed by: COLON & RECTAL SURGERY

## 2020-02-18 PROCEDURE — 77030019908 HC STETH ESOPH SIMS -A: Performed by: ANESTHESIOLOGY

## 2020-02-18 PROCEDURE — 77030005513 HC CATH URETH FOL11 MDII -B: Performed by: COLON & RECTAL SURGERY

## 2020-02-18 PROCEDURE — 77030002916 HC SUT ETHLN J&J -A: Performed by: COLON & RECTAL SURGERY

## 2020-02-18 PROCEDURE — 77030027138 HC INCENT SPIROMETER -A

## 2020-02-18 PROCEDURE — 77030009979 HC RELD STPLR TCR J&J -C: Performed by: COLON & RECTAL SURGERY

## 2020-02-18 PROCEDURE — 36415 COLL VENOUS BLD VENIPUNCTURE: CPT

## 2020-02-18 PROCEDURE — 51798 US URINE CAPACITY MEASURE: CPT

## 2020-02-18 RX ORDER — PROPOFOL 10 MG/ML
INJECTION, EMULSION INTRAVENOUS
Status: DISCONTINUED | OUTPATIENT
Start: 2020-02-18 | End: 2020-02-18 | Stop reason: HOSPADM

## 2020-02-18 RX ORDER — SCOLOPAMINE TRANSDERMAL SYSTEM 1 MG/1
1 PATCH, EXTENDED RELEASE TRANSDERMAL
Status: COMPLETED | OUTPATIENT
Start: 2020-02-18 | End: 2020-02-21

## 2020-02-18 RX ORDER — ALVIMOPAN 12 MG/1
12 CAPSULE ORAL ONCE
Status: COMPLETED | OUTPATIENT
Start: 2020-02-18 | End: 2020-02-18

## 2020-02-18 RX ORDER — TOPIRAMATE 25 MG/1
100 TABLET ORAL EVERY EVENING
Status: DISCONTINUED | OUTPATIENT
Start: 2020-02-18 | End: 2020-02-22 | Stop reason: HOSPADM

## 2020-02-18 RX ORDER — MIDAZOLAM HYDROCHLORIDE 1 MG/ML
INJECTION, SOLUTION INTRAMUSCULAR; INTRAVENOUS AS NEEDED
Status: DISCONTINUED | OUTPATIENT
Start: 2020-02-18 | End: 2020-02-18 | Stop reason: HOSPADM

## 2020-02-18 RX ORDER — NEOSTIGMINE METHYLSULFATE 1 MG/ML
INJECTION INTRAVENOUS AS NEEDED
Status: DISCONTINUED | OUTPATIENT
Start: 2020-02-18 | End: 2020-02-18 | Stop reason: HOSPADM

## 2020-02-18 RX ORDER — GLYCOPYRROLATE 0.2 MG/ML
INJECTION INTRAMUSCULAR; INTRAVENOUS AS NEEDED
Status: DISCONTINUED | OUTPATIENT
Start: 2020-02-18 | End: 2020-02-18 | Stop reason: HOSPADM

## 2020-02-18 RX ORDER — FENTANYL CITRATE 50 UG/ML
INJECTION, SOLUTION INTRAMUSCULAR; INTRAVENOUS AS NEEDED
Status: DISCONTINUED | OUTPATIENT
Start: 2020-02-18 | End: 2020-02-18 | Stop reason: HOSPADM

## 2020-02-18 RX ORDER — SODIUM CHLORIDE 900 MG/100ML
INJECTION INTRAVENOUS
Status: DISCONTINUED
Start: 2020-02-18 | End: 2020-02-18

## 2020-02-18 RX ORDER — SODIUM CHLORIDE, SODIUM LACTATE, POTASSIUM CHLORIDE, CALCIUM CHLORIDE 600; 310; 30; 20 MG/100ML; MG/100ML; MG/100ML; MG/100ML
100 INJECTION, SOLUTION INTRAVENOUS CONTINUOUS
Status: DISCONTINUED | OUTPATIENT
Start: 2020-02-18 | End: 2020-02-18 | Stop reason: HOSPADM

## 2020-02-18 RX ORDER — KETOROLAC TROMETHAMINE 30 MG/ML
INJECTION, SOLUTION INTRAMUSCULAR; INTRAVENOUS AS NEEDED
Status: DISCONTINUED | OUTPATIENT
Start: 2020-02-18 | End: 2020-02-18 | Stop reason: HOSPADM

## 2020-02-18 RX ORDER — HYDROMORPHONE HYDROCHLORIDE 2 MG/ML
INJECTION, SOLUTION INTRAMUSCULAR; INTRAVENOUS; SUBCUTANEOUS AS NEEDED
Status: DISCONTINUED | OUTPATIENT
Start: 2020-02-18 | End: 2020-02-18 | Stop reason: HOSPADM

## 2020-02-18 RX ORDER — MONTELUKAST SODIUM 10 MG/1
10 TABLET ORAL DAILY
Status: DISCONTINUED | OUTPATIENT
Start: 2020-02-19 | End: 2020-02-22 | Stop reason: HOSPADM

## 2020-02-18 RX ORDER — HYDROMORPHONE HYDROCHLORIDE 1 MG/ML
.25-1 INJECTION, SOLUTION INTRAMUSCULAR; INTRAVENOUS; SUBCUTANEOUS
Status: DISCONTINUED | OUTPATIENT
Start: 2020-02-18 | End: 2020-02-18 | Stop reason: HOSPADM

## 2020-02-18 RX ORDER — LIDOCAINE HYDROCHLORIDE 10 MG/ML
0.1 INJECTION, SOLUTION EPIDURAL; INFILTRATION; INTRACAUDAL; PERINEURAL AS NEEDED
Status: DISCONTINUED | OUTPATIENT
Start: 2020-02-18 | End: 2020-02-18 | Stop reason: HOSPADM

## 2020-02-18 RX ORDER — HYDROMORPHONE HCL/0.9% NACL/PF 0.5 MG/ML
PLASTIC BAG, INJECTION (ML) INTRAVENOUS
Status: DISCONTINUED | OUTPATIENT
Start: 2020-02-18 | End: 2020-02-20

## 2020-02-18 RX ORDER — ONDANSETRON 2 MG/ML
INJECTION INTRAMUSCULAR; INTRAVENOUS AS NEEDED
Status: DISCONTINUED | OUTPATIENT
Start: 2020-02-18 | End: 2020-02-18 | Stop reason: HOSPADM

## 2020-02-18 RX ORDER — HEPARIN SODIUM 5000 [USP'U]/ML
5000 INJECTION, SOLUTION INTRAVENOUS; SUBCUTANEOUS EVERY 12 HOURS
Status: DISCONTINUED | OUTPATIENT
Start: 2020-02-19 | End: 2020-02-22 | Stop reason: HOSPADM

## 2020-02-18 RX ORDER — EPHEDRINE SULFATE/0.9% NACL/PF 50 MG/5 ML
SYRINGE (ML) INTRAVENOUS AS NEEDED
Status: DISCONTINUED | OUTPATIENT
Start: 2020-02-18 | End: 2020-02-18 | Stop reason: HOSPADM

## 2020-02-18 RX ORDER — ONDANSETRON 2 MG/ML
4 INJECTION INTRAMUSCULAR; INTRAVENOUS
Status: DISCONTINUED | OUTPATIENT
Start: 2020-02-18 | End: 2020-02-22 | Stop reason: HOSPADM

## 2020-02-18 RX ORDER — LIDOCAINE HYDROCHLORIDE 20 MG/ML
INJECTION, SOLUTION EPIDURAL; INFILTRATION; INTRACAUDAL; PERINEURAL AS NEEDED
Status: DISCONTINUED | OUTPATIENT
Start: 2020-02-18 | End: 2020-02-18 | Stop reason: HOSPADM

## 2020-02-18 RX ORDER — DEXAMETHASONE SODIUM PHOSPHATE 4 MG/ML
INJECTION, SOLUTION INTRA-ARTICULAR; INTRALESIONAL; INTRAMUSCULAR; INTRAVENOUS; SOFT TISSUE AS NEEDED
Status: DISCONTINUED | OUTPATIENT
Start: 2020-02-18 | End: 2020-02-18 | Stop reason: HOSPADM

## 2020-02-18 RX ORDER — ALVIMOPAN 12 MG/1
12 CAPSULE ORAL 2 TIMES DAILY
Status: DISCONTINUED | OUTPATIENT
Start: 2020-02-19 | End: 2020-02-21

## 2020-02-18 RX ORDER — DEXTROSE, SODIUM CHLORIDE, AND POTASSIUM CHLORIDE 5; .45; .15 G/100ML; G/100ML; G/100ML
50 INJECTION INTRAVENOUS CONTINUOUS
Status: DISCONTINUED | OUTPATIENT
Start: 2020-02-18 | End: 2020-02-21

## 2020-02-18 RX ORDER — SIMETHICONE 80 MG
80 TABLET,CHEWABLE ORAL
Status: DISCONTINUED | OUTPATIENT
Start: 2020-02-18 | End: 2020-02-22 | Stop reason: HOSPADM

## 2020-02-18 RX ORDER — PROPOFOL 10 MG/ML
INJECTION, EMULSION INTRAVENOUS AS NEEDED
Status: DISCONTINUED | OUTPATIENT
Start: 2020-02-18 | End: 2020-02-18 | Stop reason: HOSPADM

## 2020-02-18 RX ORDER — ROCURONIUM BROMIDE 10 MG/ML
INJECTION, SOLUTION INTRAVENOUS AS NEEDED
Status: DISCONTINUED | OUTPATIENT
Start: 2020-02-18 | End: 2020-02-18 | Stop reason: HOSPADM

## 2020-02-18 RX ORDER — ACETAMINOPHEN 500 MG
1000 TABLET ORAL EVERY 6 HOURS
Status: DISCONTINUED | OUTPATIENT
Start: 2020-02-18 | End: 2020-02-22 | Stop reason: HOSPADM

## 2020-02-18 RX ADMIN — FENTANYL CITRATE 50 MCG: 50 INJECTION INTRAMUSCULAR; INTRAVENOUS at 08:58

## 2020-02-18 RX ADMIN — FENTANYL CITRATE 50 MCG: 50 INJECTION INTRAMUSCULAR; INTRAVENOUS at 08:38

## 2020-02-18 RX ADMIN — Medication: at 11:43

## 2020-02-18 RX ADMIN — MIDAZOLAM 2 MG: 1 INJECTION INTRAMUSCULAR; INTRAVENOUS at 07:53

## 2020-02-18 RX ADMIN — ALVIMOPAN 12 MG: 12 CAPSULE ORAL at 06:35

## 2020-02-18 RX ADMIN — FENTANYL CITRATE 50 MCG: 50 INJECTION INTRAMUSCULAR; INTRAVENOUS at 07:59

## 2020-02-18 RX ADMIN — DEXAMETHASONE SODIUM PHOSPHATE 8 MG: 4 INJECTION, SOLUTION INTRAMUSCULAR; INTRAVENOUS at 08:18

## 2020-02-18 RX ADMIN — ROCURONIUM BROMIDE 10 MG: 50 INJECTION, SOLUTION INTRAVENOUS at 09:47

## 2020-02-18 RX ADMIN — CEFOXITIN SODIUM 2 G: 2 POWDER, FOR SOLUTION INTRAVENOUS at 08:10

## 2020-02-18 RX ADMIN — ACETAMINOPHEN 1000 MG: 500 TABLET ORAL at 19:58

## 2020-02-18 RX ADMIN — PROPOFOL 180 MG: 10 INJECTION, EMULSION INTRAVENOUS at 08:00

## 2020-02-18 RX ADMIN — KETOROLAC TROMETHAMINE 30 MG: 30 INJECTION INTRAMUSCULAR; INTRAVENOUS at 10:51

## 2020-02-18 RX ADMIN — ONDANSETRON 4 MG: 2 INJECTION INTRAMUSCULAR; INTRAVENOUS at 17:41

## 2020-02-18 RX ADMIN — Medication 5 MG: at 08:21

## 2020-02-18 RX ADMIN — FENTANYL CITRATE 50 MCG: 50 INJECTION INTRAMUSCULAR; INTRAVENOUS at 07:56

## 2020-02-18 RX ADMIN — GLYCOPYRROLATE 0.2 MG: 0.2 INJECTION, SOLUTION INTRAMUSCULAR; INTRAVENOUS at 10:58

## 2020-02-18 RX ADMIN — HYDROMORPHONE HYDROCHLORIDE 0.5 MG: 1 INJECTION, SOLUTION INTRAMUSCULAR; INTRAVENOUS; SUBCUTANEOUS at 12:07

## 2020-02-18 RX ADMIN — SODIUM CHLORIDE, SODIUM LACTATE, POTASSIUM CHLORIDE, AND CALCIUM CHLORIDE 100 ML/HR: 600; 310; 30; 20 INJECTION, SOLUTION INTRAVENOUS at 06:34

## 2020-02-18 RX ADMIN — MIDAZOLAM 2 MG: 1 INJECTION INTRAMUSCULAR; INTRAVENOUS at 07:56

## 2020-02-18 RX ADMIN — HYDROMORPHONE HYDROCHLORIDE 0.5 MG: 2 INJECTION INTRAMUSCULAR; INTRAVENOUS; SUBCUTANEOUS at 09:26

## 2020-02-18 RX ADMIN — FAMOTIDINE 20 MG: 10 INJECTION, SOLUTION INTRAVENOUS at 20:04

## 2020-02-18 RX ADMIN — DEXTROSE MONOHYDRATE, SODIUM CHLORIDE, AND POTASSIUM CHLORIDE 100 ML/HR: 50; 4.5; 1.49 INJECTION, SOLUTION INTRAVENOUS at 19:58

## 2020-02-18 RX ADMIN — ROCURONIUM BROMIDE 10 MG: 50 INJECTION, SOLUTION INTRAVENOUS at 08:55

## 2020-02-18 RX ADMIN — GLYCOPYRROLATE 0.4 MG: 0.2 INJECTION, SOLUTION INTRAMUSCULAR; INTRAVENOUS at 10:48

## 2020-02-18 RX ADMIN — TOPIRAMATE 100 MG: 25 TABLET, FILM COATED ORAL at 17:41

## 2020-02-18 RX ADMIN — ONDANSETRON 4 MG: 2 INJECTION INTRAMUSCULAR; INTRAVENOUS at 10:51

## 2020-02-18 RX ADMIN — ROCURONIUM BROMIDE 10 MG: 50 INJECTION, SOLUTION INTRAVENOUS at 08:33

## 2020-02-18 RX ADMIN — Medication 5 MG: at 10:26

## 2020-02-18 RX ADMIN — DEXTROSE MONOHYDRATE, SODIUM CHLORIDE, AND POTASSIUM CHLORIDE 100 ML/HR: 50; 4.5; 1.49 INJECTION, SOLUTION INTRAVENOUS at 11:46

## 2020-02-18 RX ADMIN — PROPOFOL 30 MCG/KG/MIN: 10 INJECTION, EMULSION INTRAVENOUS at 08:03

## 2020-02-18 RX ADMIN — HYDROMORPHONE HYDROCHLORIDE 0.5 MG: 2 INJECTION INTRAMUSCULAR; INTRAVENOUS; SUBCUTANEOUS at 10:44

## 2020-02-18 RX ADMIN — ROCURONIUM BROMIDE 40 MG: 50 INJECTION, SOLUTION INTRAVENOUS at 08:00

## 2020-02-18 RX ADMIN — LIDOCAINE HYDROCHLORIDE 60 MG: 20 INJECTION, SOLUTION INTRAVENOUS at 08:00

## 2020-02-18 RX ADMIN — FENTANYL CITRATE 50 MCG: 50 INJECTION INTRAMUSCULAR; INTRAVENOUS at 09:15

## 2020-02-18 RX ADMIN — NEOSTIGMINE METHYLSULFATE 3 MG: 1 INJECTION, SOLUTION INTRAVENOUS at 10:48

## 2020-02-18 RX ADMIN — SODIUM CHLORIDE, SODIUM LACTATE, POTASSIUM CHLORIDE, AND CALCIUM CHLORIDE: 600; 310; 30; 20 INJECTION, SOLUTION INTRAVENOUS at 10:28

## 2020-02-18 NOTE — ACP (ADVANCE CARE PLANNING)
responded to an in-basket request to assist patient, Kaylyn Carey, with an Advance Medical Directive (AMD) on the Post. Surgical Ortho unit. Kaylyn Carey was awake, alert, and sitting up in bed when the  came into the room. Her mother and , Darline Verde, were sitting at the bedside.  introduced herself and inquired about the AMD request. Kaylyn Carey thought that she had already filled one out but wanted to hear more information about the document.  explained that AMD form to Kaylyn Carey and her family and answered questions as needed. Kaylyn Carey decided not to fill out the form at this time.  left the AMD form with Kaylyn Carey and she thanked the  for the information. She expressed no other needs. Advised of 's availability.  will follow up as able and/or needed   Telly Coronel. Steffi Mercer.      Paging Service: [phone])

## 2020-02-18 NOTE — ANESTHESIA POSTPROCEDURE EVALUATION
Procedure(s):  LAPAROSCOPIC ILEOCECECTOMY. general    Anesthesia Post Evaluation      Multimodal analgesia: multimodal analgesia used between 6 hours prior to anesthesia start to PACU discharge  Patient location during evaluation: bedside  Patient participation: complete - patient participated  Level of consciousness: awake  Pain management: adequate  Airway patency: patent  Anesthetic complications: no  Cardiovascular status: acceptable  Respiratory status: acceptable  Hydration status: acceptable        Vitals Value Taken Time   /60 2/18/2020 12:15 PM   Temp 36.8 °C (98.2 °F) 2/18/2020 12:15 PM   Pulse 83 2/18/2020 12:20 PM   Resp 22 2/18/2020 12:20 PM   SpO2 97 % 2/18/2020 12:20 PM   Vitals shown include unvalidated device data.

## 2020-02-18 NOTE — PERIOP NOTES
TRANSFER - OUT REPORT:    Verbal report given to Marisol RN(name) on Tiffani Vu  being transferred to [facility](unit) for routine post - op       Report consisted of patients Situation, Background, Assessment and   Recommendations(SBAR). Information from the following report(s) SBAR, Kardex and MAR was reviewed with the receiving nurse. Lines:   Peripheral IV 02/18/20 Right Hand (Active)   Site Assessment Clean, dry, & intact 2/18/2020  6:23 AM   Phlebitis Assessment 0 2/18/2020  6:23 AM   Infiltration Assessment 0 2/18/2020  6:23 AM   Dressing Status Clean, dry, & intact; Occlusive 2/18/2020  6:23 AM   Hub Color/Line Status Pink; Infusing;Patent 2/18/2020  6:23 AM   Alcohol Cap Used Yes 2/18/2020  6:23 AM        Opportunity for questions and clarification was provided.       Patient transported with:   Registered Nurse

## 2020-02-18 NOTE — ANESTHESIA PREPROCEDURE EVALUATION
Relevant Problems   No relevant active problems       Anesthetic History   No history of anesthetic complications            Review of Systems / Medical History  Patient summary reviewed, nursing notes reviewed and pertinent labs reviewed    Pulmonary  Within defined limits                 Neuro/Psych   Within defined limits           Cardiovascular  Within defined limits                     GI/Hepatic/Renal  Within defined limits              Endo/Other  Within defined limits           Other Findings   Comments: Chron's disease           Physical Exam    Airway  Mallampati: II  TM Distance: 4 - 6 cm  Neck ROM: normal range of motion   Mouth opening: Normal     Cardiovascular    Rhythm: regular  Rate: normal         Dental    Dentition: Lower dentition intact and Upper dentition intact     Pulmonary  Breath sounds clear to auscultation               Abdominal         Other Findings            Anesthetic Plan    ASA: 2  Anesthesia type: general          Induction: Intravenous  Anesthetic plan and risks discussed with: Patient

## 2020-02-18 NOTE — PROGRESS NOTES
Spiritual Care Assessment/Progress Note  [address]      NAME: Jesse Gordon      MRN: [id number]  AGE: 37 y.o.  SEX: female  Mosque Affiliation: Umesh   Language: English     2/18/2020     Total Time (in minutes): 25     Spiritual Assessment begun in [facility] POST SURG ORT 1 through conversation with:         [x]Patient        [] Family    [] Friend(s)        Reason for Consult: Advance medical directive consult     Spiritual beliefs: (Please include comment if needed)     [] Identifies with a eugenia tradition:         [] Supported by a eugenia community:            [] Claims no spiritual orientation:           [] Seeking spiritual identity:                [] Adheres to an individual form of spirituality:           [x] Not able to assess:                           Identified resources for coping:      [] Prayer                               [] Music                  [] Guided Imagery     [x] Family/friends                 [] Pet visits     [] Devotional reading                         [] Unknown     [] Other:                                            Interventions offered during this visit: (See comments for more details)    Patient Interventions: Advance medical directive consult           Plan of Care:     [] Support spiritual and/or cultural needs    [] Support AMD and/or advance care planning process      [] Support grieving process   [] Coordinate Rites and/or Rituals    [] Coordination with community clergy   [] No spiritual needs identified at this time   [] Detailed Plan of Care below (See Comments)  [] Make referral to Music Therapy  [] Make referral to Pet Therapy     [] Make referral to Addiction services  [] Make referral to [facility]  [] Make referral to Spiritual Care Partner  [] No future visits requested        [x] Follow up visits as needed     Comments:   responded to an in-basket request to assist patient, Padmini Huang, with an Advance Medical Directive (AMD) on the Post. Surgical Ortho unit. Marva Adams was awake, alert, and sitting up in bed when the  came into the room. Her mother and , Veronica Larson, were sitting at the bedside.  introduced herself and inquired about the AMD request. Marva Adams thought that she had already filled one out but wanted to hear more information about the document.  explained that AMD form to Marva Adams and her family and answered questions as needed. Marva Adams decided not to fill out the form at this time.  left the AMD form with Marva Adams and she thanked the  for the information. She expressed no other needs. Advised of 's availability.  will follow up as able and/or needed   [facility]. Nida Guerrero.      Paging Service: [phone])

## 2020-02-18 NOTE — PROGRESS NOTES
2/18/2020  4:39 PM  Reason for Admission: Elective - Stricturing Crohn's disease of ileo-cecal valve requiring LAPAROSCOPIC ILEOCECECTOMY    RUR:  10%    Advance Directive: Not on file. Pt defers to . Assessment:    Age: 37    Sex: [] Male [x]Female     Residency: [x]Private residence []Apartment []Assisted Living []LTC []Other:   Exterior Steps: 4  Interior Steps: 1 flight interior    Lives With: [x]With spouse []Other family members []Underage children []Alone []Care provider []Other:    Prior functioning:  [x]Independent []Dependent []Partial dependence   Pt requires assistance with: []Bathing []Dressing []Toileting []Ambulation     Prior DME required:  [x]None []RW []Cane []Crutches []Bedside commode   []Shower Chair []Wheelchair []Hospital Bed []Betsy []Stair lift []Rollator []Other:    DME available: [x]None []RW []Cane []Crutches []Bedside commode   []Shower Chair []Wheelchair []Hospital Bed []Betsy []Stair lift []Rollator []Other:    Rehab history: [x]None []Outpatient PT []Home Health (Provider/Date: ) []SNF (Provider/Date: ) []IPR (Provider/Date: ) []LTC (Provider/Date: )    Discharge Concerns: []Yes [x]No []Unknown   Describe: Insurer:  [address] [place] [address] Transport: Family        Transition of care plan:    []Unable to determine at this time. Awaiting clinical progress, and disposition recommendations. [x] Home with outpatient follow-up    [] Home with Outpatient PT and outpatient follow-up   Pt aware of OP appt?  []Yes, Provider:   []Not scheduled   Transport provider:     [] Home with family assistance as needed and outpatient follow-up   Family able to assist:    Schedule:  Transport provider:      [] Home with Home Health   - Provider:     []SNF/IPR   -[]Preferences given:   []Listing provided and preferences requested   -Status: []Pending []Accepted:    -Auth required: []Yes []No    -Auth initiated date:   -3 midnight stay required: []Yes []No  Date satisfied:     [] Other:       Malina Meadows MA    Care Management Interventions  PCP Verified by CM: Yes  Mode of Transport at Discharge:  Other (see comment)(Family)  MyChart Signup: No  Discharge Durable Medical Equipment: No  Physical Therapy Consult: No  Occupational Therapy Consult: No  Speech Therapy Consult: No  Current Support Network: Lives with Spouse  Confirm Follow Up Transport: Family  Discharge Location  Discharge Placement: Home with family assistance

## 2020-02-18 NOTE — PERIOP NOTES
Messaged left with Volunteer to convey to family that pt is being transferred to room 409 after attempting to call .

## 2020-02-18 NOTE — PERIOP NOTES
Patient's family notified through volunteer that Dr. Dodie Carlton is closing and pt. Is doing well.

## 2020-02-18 NOTE — PERIOP NOTES
Family updated through volunteer at 1981 and 44. \"Dr. Anna Camarena is still working, the patient is doing well. \"

## 2020-02-19 LAB
ABO + RH BLD: NORMAL
ANION GAP SERPL CALC-SCNC: 5 MMOL/L (ref 5–15)
BASOPHILS # BLD: 0 K/UL (ref 0–0.1)
BASOPHILS NFR BLD: 0 % (ref 0–1)
BLD PROD TYP BPU: NORMAL
BLD PROD TYP BPU: NORMAL
BLOOD GROUP ANTIBODIES SERPL: NORMAL
BPU ID: NORMAL
BPU ID: NORMAL
BUN SERPL-MCNC: 9 MG/DL (ref 6–20)
BUN/CREAT SERPL: 12 (ref 12–20)
CALCIUM SERPL-MCNC: 8.3 MG/DL (ref 8.5–10.1)
CHLORIDE SERPL-SCNC: 109 MMOL/L (ref 97–108)
CO2 SERPL-SCNC: 23 MMOL/L (ref 21–32)
CREAT SERPL-MCNC: 0.78 MG/DL (ref 0.55–1.02)
CROSSMATCH RESULT,%XM: NORMAL
CROSSMATCH RESULT,%XM: NORMAL
DIFFERENTIAL METHOD BLD: ABNORMAL
EOSINOPHIL # BLD: 0 K/UL (ref 0–0.4)
EOSINOPHIL NFR BLD: 0 % (ref 0–7)
ERYTHROCYTE [DISTWIDTH] IN BLOOD BY AUTOMATED COUNT: 12.2 % (ref 11.5–14.5)
GLUCOSE SERPL-MCNC: 100 MG/DL (ref 65–100)
HCT VFR BLD AUTO: 32.9 % (ref 35–47)
HGB BLD-MCNC: 10.8 G/DL (ref 11.5–16)
IMM GRANULOCYTES # BLD AUTO: 0.1 K/UL (ref 0–0.04)
IMM GRANULOCYTES NFR BLD AUTO: 0 % (ref 0–0.5)
LYMPHOCYTES # BLD: 1.8 K/UL (ref 0.8–3.5)
LYMPHOCYTES NFR BLD: 13 % (ref 12–49)
MAGNESIUM SERPL-MCNC: 2 MG/DL (ref 1.6–2.4)
MCH RBC QN AUTO: 30.5 PG (ref 26–34)
MCHC RBC AUTO-ENTMCNC: 32.8 G/DL (ref 30–36.5)
MCV RBC AUTO: 92.9 FL (ref 80–99)
MONOCYTES # BLD: 0.9 K/UL (ref 0–1)
MONOCYTES NFR BLD: 7 % (ref 5–13)
NEUTS SEG # BLD: 10.6 K/UL (ref 1.8–8)
NEUTS SEG NFR BLD: 80 % (ref 32–75)
NRBC # BLD: 0 K/UL (ref 0–0.01)
NRBC BLD-RTO: 0 PER 100 WBC
PHOSPHATE SERPL-MCNC: 2.6 MG/DL (ref 2.6–4.7)
PLATELET # BLD AUTO: 222 K/UL (ref 150–400)
PMV BLD AUTO: 10.3 FL (ref 8.9–12.9)
POTASSIUM SERPL-SCNC: 3.9 MMOL/L (ref 3.5–5.1)
RBC # BLD AUTO: 3.54 M/UL (ref 3.8–5.2)
SODIUM SERPL-SCNC: 137 MMOL/L (ref 136–145)
SPECIMEN EXP DATE BLD: NORMAL
STATUS OF UNIT,%ST: NORMAL
STATUS OF UNIT,%ST: NORMAL
UNIT DIVISION, %UDIV: 0
UNIT DIVISION, %UDIV: 0
WBC # BLD AUTO: 13.4 K/UL (ref 3.6–11)

## 2020-02-19 PROCEDURE — 65270000029 HC RM PRIVATE

## 2020-02-19 PROCEDURE — 84100 ASSAY OF PHOSPHORUS: CPT

## 2020-02-19 PROCEDURE — 85025 COMPLETE CBC W/AUTO DIFF WBC: CPT

## 2020-02-19 PROCEDURE — 74011250637 HC RX REV CODE- 250/637: Performed by: COLON & RECTAL SURGERY

## 2020-02-19 PROCEDURE — 83735 ASSAY OF MAGNESIUM: CPT

## 2020-02-19 PROCEDURE — 80048 BASIC METABOLIC PNL TOTAL CA: CPT

## 2020-02-19 PROCEDURE — 74011250636 HC RX REV CODE- 250/636: Performed by: COLON & RECTAL SURGERY

## 2020-02-19 PROCEDURE — 36415 COLL VENOUS BLD VENIPUNCTURE: CPT

## 2020-02-19 RX ADMIN — DEXTROSE MONOHYDRATE, SODIUM CHLORIDE, AND POTASSIUM CHLORIDE 50 ML/HR: 50; 4.5; 1.49 INJECTION, SOLUTION INTRAVENOUS at 22:02

## 2020-02-19 RX ADMIN — FAMOTIDINE 20 MG: 10 INJECTION, SOLUTION INTRAVENOUS at 20:15

## 2020-02-19 RX ADMIN — ACETAMINOPHEN 1000 MG: 500 TABLET ORAL at 14:30

## 2020-02-19 RX ADMIN — ACETAMINOPHEN 1000 MG: 500 TABLET ORAL at 08:37

## 2020-02-19 RX ADMIN — Medication: at 20:12

## 2020-02-19 RX ADMIN — TOPIRAMATE 100 MG: 25 TABLET, FILM COATED ORAL at 18:11

## 2020-02-19 RX ADMIN — ONDANSETRON 4 MG: 2 INJECTION INTRAMUSCULAR; INTRAVENOUS at 20:15

## 2020-02-19 RX ADMIN — HEPARIN SODIUM 5000 UNITS: 5000 INJECTION INTRAVENOUS; SUBCUTANEOUS at 05:48

## 2020-02-19 RX ADMIN — ACETAMINOPHEN 1000 MG: 500 TABLET ORAL at 20:15

## 2020-02-19 RX ADMIN — DEXTROSE MONOHYDRATE, SODIUM CHLORIDE, AND POTASSIUM CHLORIDE 100 ML/HR: 50; 4.5; 1.49 INJECTION, SOLUTION INTRAVENOUS at 05:47

## 2020-02-19 RX ADMIN — MONTELUKAST SODIUM 10 MG: 10 TABLET, FILM COATED ORAL at 08:37

## 2020-02-19 RX ADMIN — HEPARIN SODIUM 5000 UNITS: 5000 INJECTION INTRAVENOUS; SUBCUTANEOUS at 18:12

## 2020-02-19 RX ADMIN — FAMOTIDINE 20 MG: 10 INJECTION, SOLUTION INTRAVENOUS at 08:37

## 2020-02-19 RX ADMIN — ALVIMOPAN 12 MG: 12 CAPSULE ORAL at 08:37

## 2020-02-19 RX ADMIN — ACETAMINOPHEN 1000 MG: 500 TABLET ORAL at 03:16

## 2020-02-19 RX ADMIN — ALVIMOPAN 12 MG: 12 CAPSULE ORAL at 18:12

## 2020-02-19 NOTE — OP NOTES
[facility] 77  OPERATIVE REPORT    Name:  Brain Fairchild  MR#:  [id number]  :  1985  ACCOUNT #:  [de-identified]  DATE OF SERVICE:  2020    PREOPERATIVE DIAGNOSIS:  Stricturing Crohn's disease of the ileocecal valve. POSTOPERATIVE DIAGNOSIS:  Stricturing Crohn's disease of the ileocecal valve. PROCEDURE PERFORMED:  Laparoscopic ileocecectomy. SURGEON:  Clari Silvestre MD    ASSISTANT:  Elena Alicea. ANESTHESIA:  GET. COMPLICATIONS:  none    SPECIMENS REMOVED:  1. Ileocecal ileum and cecum. 2.  First anastomosis. 3.  Lateral anastomosis. IMPLANTS:  none    ESTIMATED BLOOD LOSS:  50 mL. FLUIDS:  1800 mL of crystalloid. URINE OUTPUT:  Please see Anesthesia record. INDICATIONS:  A very pleasant 41-year-old female who is a patient of Dr. Fortino Hatchet and Dr. Kei Sanz. She was found to have stricturing Crohn's disease of the ileocecal valve. This was refractory to medical management. As this was likely a chronic scar, this is not going to respond to medical therapy. Therefore, she is here today for resection of her terminal ileum and cecum. FINDINGS:  There is inflammation and stricturing at the ileocecal valve. PROCEDURE:  The patient was brought to the operating room theater and a standard pause was observed by the entire staff with the patient's name and procedure clearly identified by all. Next, the patient was placed In the supine position and padded according to standard protocol. She was prepped and draped in a sterile surgical fashion. Next, a 7-cm GelPort incision was made at the umbilicus in the midline. Dissection proceeded down through subcutaneous tissue until the fascia was encountered. The fascia was then incised. Upon gaining entry into the abdominal cavity, GelPort was then placed to the site. The trocar was placed in the GelPort and pneumoperitoneum was established.   Next, a 5-mm left upper quadrant trocar was placed underneath laparoscopic visualization followed by a 12-mm left upper quadrant trocar. Eventually, a 5-mm right upper quadrant trocar was also placed. The patient was placed in the Trendelenburg right side up position. I inspected all four quadrants. There were findings consistent with stricture at her ileocecal valve. Next, the lateral attachments to the right colon were freed up using Enseal energy device. The root of the mesentery was then freed up working my way up towards the duodenal sweep. The right ureter was identified and was preserved. Next, I incised the hepatocolic ligament and some of the omental attachments to the proximal transverse colon. At this point, I felt like there was sufficient mobility of the right colon to deliver out through the hand port. The right colon was delivered out through the hand port and I prepared for an extracorporeal resection with an extracorporeal anastomosis. In a controlled fashion, I divided upstream from the ileocecal valve using a MORRIS blue cartridge 75-mm stapler. Just downstream from the cecum and the ascending colon, a window was created at a grossly normal-appearing section of her colon. Using a reload of the stapler, I divided the colon at this location. The remaining mesentery was then divided using the Enseal energy device. Next, I prepared for a side-to-side antimesenteric border to antimesenteric border anastomosis between the terminal ileum and ascending colon. A reload was used for this purpose. A second load was used to amputate off the enterotomy and previous lateral staple lines. However, I was ultimately not happy with this anastomosis. Therefore, this anastomosis was resected. I used a new stapler. I divided just upstream and downstream from her anastomosis using a 75-mm MORRIS blue cartridge. This first anastomosis will be sent with the specimen.   Next, I prepared once more for antimesenteric border to antimesenteric border anastomosis. An enterotomy and colotomy were created in a controlled fashion. Next, using a 75-mm MORRIS stapler, blue cartridge, a side-to-side anastomosis was performed. Next, a reload was used to amputate off the previous enterotomy and colotomy en bloc with the previous lateral staple lines. Two loads were used for this purpose. Next, gloves were changed. I inspected the anastomosis. Both limbs appeared to be pink. This appeared to be widely patent. As per my preference, I oversewed all staple lines using interrupted 3-0 silk Lembert stitches. The mesenteric defect was then closed using 3-0 silk stitches. The omentum was tacked over the anastomosis using 3-0 silk. Next, the anastomosis was delivered back into the abdominal cavity. Next, gloves were changed once more. With all sponge count and instrument counts correct, six-packs of Seprafilm was placed in the viscera. Exparel was injected to both fascial margins. Please note that I closed the 12-mm trocar site from within the abdomen using an 0 Vicryl figure-of-eight stitch. Last, using a separate back table and new gown and gloves, the fascia was closed using an 0 PDS stitch. Subcutaneous tissue was irrigated with saline. Skin incisions were all closed using a 4-0 Monocryl subcuticular stitch followed by Dermabond. The procedure was then terminated.         Yves Bloom MD      [id number]  D:  02/18/2020 11:01  T:  02/18/2020 20:47  JOB #:  [id number]  CC:  MD Geraldo Langston MD Darby Dirks, MD

## 2020-02-19 NOTE — PROGRESS NOTES
CRS  Pt with some postop pain  Flowsheet, labs reviewed  Abd: soft, approp tender  Inc: c/d/i    Plan:  Clears, decrease ivf, dc hussein, mobilize, pulm toilet

## 2020-02-19 NOTE — PROGRESS NOTES
2/19/2020  4:38 PM  CM reviewed EMR for coordination of dispo needs. Pt advancing diet. Awaiting medical stability for discharge. CM to continue to monitor. Transition of care plan:  1. Medical clearance. 2. Home with family assistance as needed. 3. Outpatient follow-up. 4. Pt's family to transport.

## 2020-02-19 NOTE — PROGRESS NOTES
Problem: Falls - Risk of  Goal: *Absence of Falls  Description  Document [place] Jeff Fall Risk and appropriate interventions in the flowsheet.   Outcome: Progressing Towards Goal  Note: Fall Risk Interventions:  Mobility Interventions: Patient to call before getting OOB    Medication Interventions: Teach patient to arise slowly       Problem: Pain  Goal: *Control of Pain  Outcome: Progressing Towards Goal

## 2020-02-20 PROCEDURE — 74011250637 HC RX REV CODE- 250/637: Performed by: COLON & RECTAL SURGERY

## 2020-02-20 PROCEDURE — 74011000250 HC RX REV CODE- 250: Performed by: COLON & RECTAL SURGERY

## 2020-02-20 PROCEDURE — 65270000029 HC RM PRIVATE

## 2020-02-20 PROCEDURE — 74011250636 HC RX REV CODE- 250/636: Performed by: COLON & RECTAL SURGERY

## 2020-02-20 RX ORDER — OXYCODONE HYDROCHLORIDE 5 MG/1
5 TABLET ORAL
Status: DISCONTINUED | OUTPATIENT
Start: 2020-02-20 | End: 2020-02-22 | Stop reason: HOSPADM

## 2020-02-20 RX ORDER — HYDROMORPHONE HYDROCHLORIDE 1 MG/ML
0.5 INJECTION, SOLUTION INTRAMUSCULAR; INTRAVENOUS; SUBCUTANEOUS
Status: DISCONTINUED | OUTPATIENT
Start: 2020-02-20 | End: 2020-02-22 | Stop reason: HOSPADM

## 2020-02-20 RX ADMIN — ONDANSETRON 4 MG: 2 INJECTION INTRAMUSCULAR; INTRAVENOUS at 16:37

## 2020-02-20 RX ADMIN — DEXTROSE MONOHYDRATE, SODIUM CHLORIDE, AND POTASSIUM CHLORIDE 50 ML/HR: 50; 4.5; 1.49 INJECTION, SOLUTION INTRAVENOUS at 18:33

## 2020-02-20 RX ADMIN — ACETAMINOPHEN 1000 MG: 500 TABLET ORAL at 13:20

## 2020-02-20 RX ADMIN — TOPIRAMATE 100 MG: 25 TABLET, FILM COATED ORAL at 18:31

## 2020-02-20 RX ADMIN — ACETAMINOPHEN 1000 MG: 500 TABLET ORAL at 22:24

## 2020-02-20 RX ADMIN — OXYCODONE 5 MG: 5 TABLET ORAL at 19:21

## 2020-02-20 RX ADMIN — SIMETHICONE CHEW TAB 80 MG 80 MG: 80 TABLET ORAL at 19:21

## 2020-02-20 RX ADMIN — OXYCODONE 5 MG: 5 TABLET ORAL at 13:20

## 2020-02-20 RX ADMIN — ACETAMINOPHEN 1000 MG: 500 TABLET ORAL at 07:20

## 2020-02-20 RX ADMIN — ONDANSETRON 4 MG: 2 INJECTION INTRAMUSCULAR; INTRAVENOUS at 09:42

## 2020-02-20 RX ADMIN — HEPARIN SODIUM 5000 UNITS: 5000 INJECTION INTRAVENOUS; SUBCUTANEOUS at 07:21

## 2020-02-20 RX ADMIN — HEPARIN SODIUM 5000 UNITS: 5000 INJECTION INTRAVENOUS; SUBCUTANEOUS at 18:32

## 2020-02-20 RX ADMIN — FAMOTIDINE 20 MG: 10 INJECTION, SOLUTION INTRAVENOUS at 08:00

## 2020-02-20 RX ADMIN — HYDROMORPHONE HYDROCHLORIDE 0.5 MG: 1 INJECTION, SOLUTION INTRAMUSCULAR; INTRAVENOUS; SUBCUTANEOUS at 09:41

## 2020-02-20 RX ADMIN — ALVIMOPAN 12 MG: 12 CAPSULE ORAL at 18:32

## 2020-02-20 RX ADMIN — ALVIMOPAN 12 MG: 12 CAPSULE ORAL at 08:00

## 2020-02-20 RX ADMIN — FAMOTIDINE 20 MG: 10 INJECTION, SOLUTION INTRAVENOUS at 22:24

## 2020-02-20 RX ADMIN — MONTELUKAST SODIUM 10 MG: 10 TABLET, FILM COATED ORAL at 08:00

## 2020-02-20 NOTE — PROGRESS NOTES
Bedside shift change report given to Jose Ibarra (oncoming nurse) by Zachery Brantley (offgoing nurse). Report included the following information SBAR, Kardex, Intake/Output and MAR.

## 2020-02-20 NOTE — PROGRESS NOTES
CRS  Pt without keegan. \"burping\" no flatus  flowsheet reviewed  Abd: soft, approp tender  Inc: c/d/i    Plan:  Dc pca, change to PO meds. If flatus, adv to [employer].  mobilize

## 2020-02-20 NOTE — PROGRESS NOTES
Nutrition Assessment:    RECOMMENDATIONS/INTERVENTION(S):   1. Continue GI Lite diet. 2. Recommend Ensure Clear BID (480 kcal, 16 gm protein) and Ensure Enlive x1/d (350 kcal, 20 gm protein) to promote adequate PO intake. 3. Monitor PO intake, GI function, labs, and weight trends. ASSESSMENT:   2/20: 38 y/o F admitted with Crohn's disease. Pt screen for LOS. PMH - Crohn's disease. S/p laparoscopic ileocecectomy 2/18. Pt advanced to GI Lite today, previously on CLD. Pt has been able to consume ~25% meals (per meal intake in EMR and pt report). Pt c/o some abdominal pain and some nausea at time of visit. Pt reports good appetite PTA, no changes to typical eating patterns. Pt typically avoids fried foods other than that, consumed a wide variety diet. Frequently consumes Premier Protein shakes at home.  lb. BMI 26.6 c/w overweight. Weight Hx per EMR shows gradual weight gain. NKFA. LBM 2/18 - has not passed flatus. Skin without PI. Pt agreeable to trial ONS in-house to promote adequate PO intake. Labs - Ca 8.3 L. Meds - famotidine, heparin, ondansetron. Diet Order: Other (comment)(GI Lite)  % Eaten:    Patient Vitals for the past 72 hrs:   % Diet Eaten   02/20/20 1133 25 %   02/20/20 1003 25 %   02/19/20 1856 25 %       Pertinent Medications: [x] Reviewed    Labs: [x] Reviewed    Anthropometrics: Height:   Weight:      IBW (%IBW):   ( ) UBW (%UBW):   (  %)      BMI: There is no height or weight on file to calculate BMI. 26.6 - based on 190 lb (2/10)   This BMI is indicative of:   [] Underweight    [] Normal    [x] Overweight    []  Obesity    []  Extreme Obesity (BMI>40)  Estimated Nutrition Needs (Based on): 2146 Kcals/day(REE 1651 x 1.3) , 90 g(87 - 104 gm (1.0 - 1.2 gm/kg)) Protein  Carbohydrate:  At Least 130 g/day  Fluids: 2146 mL/day (1 ml/kcal)    Last BM: 2/18   [x]Active     []Hyperactive  []Hypoactive       [] Absent   BS  Skin:    [] Intact   [x] Incision - abdomen  [] Breakdown   [] DTI   [] Tears/Excoriation/Abrasion  []Edema [] Other:    Wt Readings from Last 30 Encounters:   02/10/20 86.5 kg (190 lb 11.2 oz)   12/31/19 82.1 kg (181 lb)   08/12/19 80.7 kg (178 lb)   05/30/18 76.2 kg (168 lb)   07/14/17 80.7 kg (178 lb)   02/17/16 80.7 kg (178 lb)      NUTRITION DIAGNOSES:   Problem:  Altered GI function     Etiology: related to alteration in GI structure/ function     Signs/Symptoms: as evidenced by Dx Crohn's disease, on GI lite diet       NUTRITION INTERVENTIONS:  Meals/Snacks: General/healthful diet   Supplements: Commercial supplement              GOAL:   PO intake >75% + ONS without GI distress within 5-7 days     Cultural, Baptist, or Ethnic Dietary Needs: None     EDUCATION & DISCHARGE NEEDS:    [x] None Identified   [] Identified and Education Provided/Documented   [] Identified and Pt declined/was not appropriate      [x] Interdisciplinary Care Plan Reviewed/Documented    [x] Discharge Needs:    GI Lite    [] No Nutrition Related Discharge Needs    NUTRITION RISK:   Pt Is At Nutrition Risk  [x]     No Nutrition Risk Identified  []       PT SEEN FOR:    []  MD Consult: []Calorie Count      []Diabetic Diet Education        []Diet Education     []Electrolyte Management     []General Nutrition Management and Supplements     []Management of Tube Feeding     []TPN Recommendations    []  RN Referral:  []MST score >=2     []Enteral/Parenteral Nutrition PTA     []Pregnant: Gestational DM or Multigestation                 [] Pressure Ulcer    []  Low BMI      [x]  Length of Stay       [] Dysphagia Diet         [] Ventilator  []  Follow-up     Previous Recommendations:   [] Implemented          [] Not Implemented          [x] Not Applicable    Previous Goal:   [] Met              [] Progressing Towards Goal              [] Not Progressing Towards Goal   [x] Not Applicable            Izabel Penn, [address] [facility]  Pager [phone]  Phone [phone]

## 2020-02-20 NOTE — PROGRESS NOTES
Bedside shift change report given to Ijeoma (oncoming nurse) by Lisa Hurst (offgoing nurse). Report included the following information SBAR, Kardex, Intake/Output and Recent Results. Bedside shift change report given to Dunia (oncoming nurse) by Les Mancsuo (offgoing nurse). Report included the following information SBAR, Kardex, Intake/Output and Recent Results.

## 2020-02-20 NOTE — PROGRESS NOTES
Patient ad kashmir to bathroom voiding. Pain is well controlled. Positive bowel sounds with flatulence. Decreased PO intake. Encouraged to drink fluids and ambulating in room and hallways.

## 2020-02-21 PROCEDURE — 65270000029 HC RM PRIVATE

## 2020-02-21 PROCEDURE — 74011250636 HC RX REV CODE- 250/636: Performed by: COLON & RECTAL SURGERY

## 2020-02-21 PROCEDURE — 74011250637 HC RX REV CODE- 250/637: Performed by: COLON & RECTAL SURGERY

## 2020-02-21 RX ORDER — ONDANSETRON 8 MG/1
8 TABLET, ORALLY DISINTEGRATING ORAL
Qty: 20 TAB | Refills: 0 | Status: SHIPPED | OUTPATIENT
Start: 2020-02-21

## 2020-02-21 RX ORDER — OXYCODONE HYDROCHLORIDE 5 MG/1
5 TABLET ORAL
Qty: 30 TAB | Refills: 0 | Status: SHIPPED | OUTPATIENT
Start: 2020-02-21 | End: 2020-02-29

## 2020-02-21 RX ADMIN — SIMETHICONE CHEW TAB 80 MG 80 MG: 80 TABLET ORAL at 09:49

## 2020-02-21 RX ADMIN — SIMETHICONE CHEW TAB 80 MG 80 MG: 80 TABLET ORAL at 19:43

## 2020-02-21 RX ADMIN — FAMOTIDINE 20 MG: 10 INJECTION, SOLUTION INTRAVENOUS at 19:43

## 2020-02-21 RX ADMIN — ACETAMINOPHEN 1000 MG: 500 TABLET ORAL at 08:10

## 2020-02-21 RX ADMIN — OXYCODONE 5 MG: 5 TABLET ORAL at 08:10

## 2020-02-21 RX ADMIN — TOPIRAMATE 100 MG: 25 TABLET, FILM COATED ORAL at 18:00

## 2020-02-21 RX ADMIN — OXYCODONE 5 MG: 5 TABLET ORAL at 20:43

## 2020-02-21 RX ADMIN — OXYCODONE 5 MG: 5 TABLET ORAL at 01:37

## 2020-02-21 RX ADMIN — HEPARIN SODIUM 5000 UNITS: 5000 INJECTION INTRAVENOUS; SUBCUTANEOUS at 07:00

## 2020-02-21 RX ADMIN — HEPARIN SODIUM 5000 UNITS: 5000 INJECTION INTRAVENOUS; SUBCUTANEOUS at 19:00

## 2020-02-21 RX ADMIN — MONTELUKAST SODIUM 10 MG: 10 TABLET, FILM COATED ORAL at 08:10

## 2020-02-21 RX ADMIN — ACETAMINOPHEN 1000 MG: 500 TABLET ORAL at 01:37

## 2020-02-21 RX ADMIN — OXYCODONE 5 MG: 5 TABLET ORAL at 14:18

## 2020-02-21 RX ADMIN — ONDANSETRON 4 MG: 2 INJECTION INTRAMUSCULAR; INTRAVENOUS at 19:43

## 2020-02-21 RX ADMIN — ACETAMINOPHEN 1000 MG: 500 TABLET ORAL at 13:57

## 2020-02-21 RX ADMIN — FAMOTIDINE 20 MG: 10 INJECTION, SOLUTION INTRAVENOUS at 08:10

## 2020-02-21 NOTE — PROGRESS NOTES
CRS  Pt with multiple loose stools, urgency  flowsheet reviewed  Abd: soft, less tender  Inc: c/d/i    Plan:  Dc entereg. heplock IV. May shower. Partner will check on this weekend.  Rx on chart

## 2020-02-21 NOTE — DISCHARGE INSTRUCTIONS
Cary L. Mikki Severe, MD, FACS  Florinda Varghese. Felipe Spears MD, Oscar Durham MD, MD Yao Blanchard MD    [facility] Discharge Instructions for Ashlee Mcdonald    1. Diagnosis: laparoscopic ileo-cecal resection (Crohn's disease of IC valve)    2. Restricted fiber diet. Advance as tolerated to pre-op diet    3. Do not drive for 2 weeks or until after your next doctors appointment. 4. May take a shower. Please wash all incisions with soap and water. Please leave incisions uncovered, unless draining (then cover with 4x4)    5. No lifting any objects weighing more than 25 pounds. 6. When you get tired during the day, take naps, as you need your rest.    7. Multiple bowel movements are normal each day for a while. 8. May walk as desired. May go up and down stairs. 9. Take pain medication as prescribed: (NO DRIVING WHILE ON PAIN MEDICATIONS). Oxy IR 5mg every 6 hours as needed for pain  Tylenol 650mg every 6 hours as needed for pain (OTC)  Zofran 8mg every 6 hours as needed for nausea. 10.  See me in the office in 10-14 days. Call as soon as discharged for an appointment . Call the Exchange [phone], if you have any questions or problems after office hours.

## 2020-02-21 NOTE — PROGRESS NOTES
Bedside and Verbal shift change report given to Ijeoma (oncoming nurse) by Skinny Rios (offgoing nurse). Report included the following information SBAR, Kardex, Intake/Output, MAR and Recent Results.

## 2020-02-21 NOTE — PROGRESS NOTES
2/21/2020  4:38 PM  CM reviewed EMR for coordination of dispo needs. Pt advancing diet. Awaiting medical stability for discharge. CM to continue to monitor. Transition of care plan:  1. Medical clearance. 2. Home with family assistance as needed. 3. Outpatient follow-up. 4. Pt's family to transport.

## 2020-02-21 NOTE — PROGRESS NOTES
Bedside shift change report given to Ijeoma (oncoming nurse) by Joellen Xiong (offgoing nurse). Report included the following information SBAR, Kardex, Intake/Output and Recent Results. Bedside shift change report given to Alhaji (oncoming nurse) by Elbert Medina (offgoing nurse). Report included the following information SBAR, Kardex, Intake/Output and Recent Results.

## 2020-02-22 PROCEDURE — 74011250637 HC RX REV CODE- 250/637: Performed by: COLON & RECTAL SURGERY

## 2020-02-22 PROCEDURE — 74011250636 HC RX REV CODE- 250/636: Performed by: COLON & RECTAL SURGERY

## 2020-02-22 RX ADMIN — HEPARIN SODIUM 5000 UNITS: 5000 INJECTION INTRAVENOUS; SUBCUTANEOUS at 06:07

## 2020-02-22 RX ADMIN — ACETAMINOPHEN 1000 MG: 500 TABLET ORAL at 08:47

## 2020-02-22 RX ADMIN — OXYCODONE 5 MG: 5 TABLET ORAL at 09:12

## 2020-02-22 RX ADMIN — SIMETHICONE CHEW TAB 80 MG 80 MG: 80 TABLET ORAL at 09:12

## 2020-02-22 RX ADMIN — MONTELUKAST SODIUM 10 MG: 10 TABLET, FILM COATED ORAL at 08:47

## 2020-02-22 RX ADMIN — FAMOTIDINE 20 MG: 10 INJECTION, SOLUTION INTRAVENOUS at 08:47

## 2020-02-22 NOTE — PROGRESS NOTES
Problem: Falls - Risk of  Goal: *Absence of Falls  Description  Document Chastity Santiago Fall Risk and appropriate interventions in the flowsheet.   Outcome: Progressing Towards Goal  Note: Fall Risk Interventions:  Mobility Interventions: Bed/chair exit alarm, Communicate number of staff needed for ambulation/transfer, Patient to call before getting OOB         Medication Interventions: Patient to call before getting OOB, Teach patient to arise slowly         History of Falls Interventions: Bed/chair exit alarm         Problem: Patient Education: Go to Patient Education Activity  Goal: Patient/Family Education  Outcome: Progressing Towards Goal     Problem: Pain  Goal: *Control of Pain  Outcome: Progressing Towards Goal     Problem: Major Small and Large Bowel Procedure: Day of Surgery (Initiate SCIP Measures for Post-Op Care)  Goal: Off Pathway (Use only if patient is Off Pathway)  Outcome: Progressing Towards Goal  Goal: Activity/Safety  Outcome: Progressing Towards Goal  Goal: Consults, if ordered  Outcome: Progressing Towards Goal  Goal: Nutrition/Diet  Outcome: Progressing Towards Goal  Goal: Medications  Outcome: Progressing Towards Goal  Goal: Respiratory  Outcome: Progressing Towards Goal  Goal: Treatments/Interventions/Procedures  Outcome: Progressing Towards Goal  Goal: Psychosocial  Outcome: Progressing Towards Goal  Goal: *Optimal pain control at patient's stated goal  Outcome: Progressing Towards Goal  Goal: *Adequate urinary output (equal to or greater than 30 milliliters/hour)  Outcome: Progressing Towards Goal  Goal: *Hemodynamically stable  Outcome: Progressing Towards Goal  Goal: *Tolerating diet  Outcome: Progressing Towards Goal  Goal: *Demonstrates progressive activity  Outcome: Progressing Towards Goal     Problem: Major Small and Large Bowel Procedure: Post-Op Day 1  Goal: Off Pathway (Use only if patient is Off Pathway)  Outcome: Progressing Towards Goal  Goal: Activity/Safety  Outcome: Progressing Towards Goal  Goal: Consults, if ordered  Outcome: Progressing Towards Goal  Goal: Diagnostic Test/Procedures  Outcome: Progressing Towards Goal  Goal: Nutrition/Diet  Outcome: Progressing Towards Goal  Goal: Discharge Planning  Outcome: Progressing Towards Goal  Goal: Medications  Outcome: Progressing Towards Goal  Goal: Respiratory  Outcome: Progressing Towards Goal  Goal: Treatments/Interventions/Procedures  Outcome: Progressing Towards Goal  Goal: Psychosocial  Outcome: Progressing Towards Goal  Goal: *Optimal pain control at patient's stated goal  Outcome: Progressing Towards Goal  Goal: *Adequate urinary output (equal to or greater than 30 milliliters/hour)  Outcome: Progressing Towards Goal  Goal: *Hemodynamically stable  Outcome: Progressing Towards Goal  Goal: *Tolerating diet  Outcome: Progressing Towards Goal  Goal: *Demonstrates progressive activity  Outcome: Progressing Towards Goal  Goal: *Lungs clear or at baseline  Outcome: Progressing Towards Goal     Problem: Major Small and Large Bowel Procedures:  Discharge Outcomes  Goal: *Hemodynamically stable  Outcome: Progressing Towards Goal  Goal: *Lungs clear or at baseline  Outcome: Progressing Towards Goal  Goal: *Demonstrates independent activity or return to baseline  Outcome: Progressing Towards Goal  Goal: *Optimal pain control at patient's stated goal  Outcome: Progressing Towards Goal  Goal: *Verbalizes understanding and describes prescribed diet  Outcome: Progressing Towards Goal  Goal: *Tolerating diet  Outcome: Progressing Towards Goal  Goal: *Verbalizes name, dosage, time, side effects, and number of days to continue medications  Outcome: Progressing Towards Goal  Goal: *No signs and symptoms of infection or wound complications  Outcome: Progressing Towards Goal  Goal: *Anxiety reduced or absent  Outcome: Progressing Towards Goal  Goal: *Verbalizes and demonstrates incision/wound/ostomy care  Outcome: Progressing Towards Goal  Goal: *Understands and describes signs and symptoms to report to providers(Stroke Metric)  Outcome: Progressing Towards Goal  Goal: *Describes follow-up/return visits to physicians  Outcome: Progressing Towards Goal  Goal: *Describes available resources and support systems  Outcome: Progressing Towards Goal     Problem: Pain  Goal: *Control of Pain  Outcome: Progressing Towards Goal

## 2020-02-22 NOTE — PROGRESS NOTES
Bedside and Verbal shift change report given to Jackelyn (oncoming nurse) by Adele Harper (offgoing nurse). Report included the following information SBAR, Kardex, Procedure Summary, Intake/Output, Accordion and Recent Results.

## 2020-02-22 NOTE — PROGRESS NOTES
I have reviewed discharge instructions with the patient and spouse. The patient and spouse verbalized understanding. Prescriptions given to patient. Opportunity for questions/clarificaion provided.

## 2020-02-24 VITALS
TEMPERATURE: 98.1 F | HEART RATE: 78 BPM | DIASTOLIC BLOOD PRESSURE: 85 MMHG | OXYGEN SATURATION: 97 % | RESPIRATION RATE: 16 BRPM | SYSTOLIC BLOOD PRESSURE: 145 MMHG

## 2020-02-25 ENCOUNTER — HOSPITAL ENCOUNTER (INPATIENT)
Age: 35
LOS: 2 days | Discharge: HOME | DRG: 948 | End: 2020-02-28
Attending: EMERGENCY MEDICINE | Admitting: SURGERY
Payer: COMMERCIAL

## 2020-02-25 DIAGNOSIS — Z90.49 S/P SMALL BOWEL RESECTION: ICD-10-CM

## 2020-02-25 DIAGNOSIS — K65.9 LOCALIZED PERITONITIS (HCC): Primary | ICD-10-CM

## 2020-02-25 DIAGNOSIS — Z90.49 S/P PARTIAL RESECTION OF COLON: ICD-10-CM

## 2020-02-25 LAB
BASOPHILS # BLD: 0.1 K/UL (ref 0–0.1)
BASOPHILS NFR BLD: 0 % (ref 0–1)
COMMENT, HOLDF: NORMAL
DIFFERENTIAL METHOD BLD: ABNORMAL
EOSINOPHIL # BLD: 0.2 K/UL (ref 0–0.4)
EOSINOPHIL NFR BLD: 1 % (ref 0–7)
ERYTHROCYTE [DISTWIDTH] IN BLOOD BY AUTOMATED COUNT: 12.2 % (ref 11.5–14.5)
HCT VFR BLD AUTO: 34.1 % (ref 35–47)
HGB BLD-MCNC: 11.1 G/DL (ref 11.5–16)
IMM GRANULOCYTES # BLD AUTO: 0.1 K/UL (ref 0–0.04)
IMM GRANULOCYTES NFR BLD AUTO: 1 % (ref 0–0.5)
LYMPHOCYTES # BLD: 1.4 K/UL (ref 0.8–3.5)
LYMPHOCYTES NFR BLD: 10 % (ref 12–49)
MCH RBC QN AUTO: 30.5 PG (ref 26–34)
MCHC RBC AUTO-ENTMCNC: 32.6 G/DL (ref 30–36.5)
MCV RBC AUTO: 93.7 FL (ref 80–99)
MONOCYTES # BLD: 0.9 K/UL (ref 0–1)
MONOCYTES NFR BLD: 6 % (ref 5–13)
NEUTS SEG # BLD: 12.2 K/UL (ref 1.8–8)
NEUTS SEG NFR BLD: 82 % (ref 32–75)
NRBC # BLD: 0 K/UL (ref 0–0.01)
NRBC BLD-RTO: 0 PER 100 WBC
PLATELET # BLD AUTO: 341 K/UL (ref 150–400)
PMV BLD AUTO: 9.8 FL (ref 8.9–12.9)
RBC # BLD AUTO: 3.64 M/UL (ref 3.8–5.2)
SAMPLES BEING HELD,HOLD: NORMAL
WBC # BLD AUTO: 14.8 K/UL (ref 3.6–11)

## 2020-02-25 PROCEDURE — 74011250636 HC RX REV CODE- 250/636: Performed by: EMERGENCY MEDICINE

## 2020-02-25 PROCEDURE — 85025 COMPLETE CBC W/AUTO DIFF WBC: CPT

## 2020-02-25 PROCEDURE — 83605 ASSAY OF LACTIC ACID: CPT

## 2020-02-25 PROCEDURE — 99284 EMERGENCY DEPT VISIT MOD MDM: CPT

## 2020-02-25 PROCEDURE — 80053 COMPREHEN METABOLIC PANEL: CPT

## 2020-02-25 PROCEDURE — 36415 COLL VENOUS BLD VENIPUNCTURE: CPT

## 2020-02-25 PROCEDURE — 96374 THER/PROPH/DIAG INJ IV PUSH: CPT

## 2020-02-25 PROCEDURE — 96375 TX/PRO/DX INJ NEW DRUG ADDON: CPT

## 2020-02-25 RX ORDER — HYDROMORPHONE HYDROCHLORIDE 1 MG/ML
1 INJECTION, SOLUTION INTRAMUSCULAR; INTRAVENOUS; SUBCUTANEOUS
Status: DISCONTINUED | OUTPATIENT
Start: 2020-02-25 | End: 2020-02-26 | Stop reason: SDUPTHER

## 2020-02-25 RX ORDER — SODIUM CHLORIDE 0.9 % (FLUSH) 0.9 %
5-40 SYRINGE (ML) INJECTION EVERY 8 HOURS
Status: DISCONTINUED | OUTPATIENT
Start: 2020-02-25 | End: 2020-02-27 | Stop reason: SDUPTHER

## 2020-02-25 RX ORDER — ONDANSETRON 2 MG/ML
4 INJECTION INTRAMUSCULAR; INTRAVENOUS
Status: COMPLETED | OUTPATIENT
Start: 2020-02-25 | End: 2020-02-26

## 2020-02-25 RX ORDER — SODIUM CHLORIDE 0.9 % (FLUSH) 0.9 %
5-40 SYRINGE (ML) INJECTION AS NEEDED
Status: DISCONTINUED | OUTPATIENT
Start: 2020-02-25 | End: 2020-02-27 | Stop reason: SDUPTHER

## 2020-02-25 RX ADMIN — SODIUM CHLORIDE 1000 ML: 900 INJECTION, SOLUTION INTRAVENOUS at 23:35

## 2020-02-25 RX ADMIN — ONDANSETRON 4 MG: 2 INJECTION INTRAMUSCULAR; INTRAVENOUS at 23:37

## 2020-02-25 RX ADMIN — HYDROMORPHONE HYDROCHLORIDE 1 MG: 1 INJECTION, SOLUTION INTRAMUSCULAR; INTRAVENOUS; SUBCUTANEOUS at 23:38

## 2020-02-26 ENCOUNTER — APPOINTMENT (OUTPATIENT)
Dept: CT IMAGING | Age: 35
DRG: 948 | End: 2020-02-26
Attending: EMERGENCY MEDICINE
Payer: COMMERCIAL

## 2020-02-26 PROBLEM — D72.829 LEUKOCYTOSIS: Status: ACTIVE | Noted: 2020-02-26

## 2020-02-26 PROBLEM — R10.9 ABDOMINAL PAIN: Status: ACTIVE | Noted: 2020-02-26

## 2020-02-26 LAB
ALBUMIN SERPL-MCNC: 3.5 G/DL (ref 3.5–5)
ALBUMIN/GLOB SERPL: 0.8 {RATIO} (ref 1.1–2.2)
ALP SERPL-CCNC: 65 U/L (ref 45–117)
ALT SERPL-CCNC: 37 U/L (ref 12–78)
ANION GAP SERPL CALC-SCNC: 5 MMOL/L (ref 5–15)
AST SERPL-CCNC: 21 U/L (ref 15–37)
BILIRUB SERPL-MCNC: 0.4 MG/DL (ref 0.2–1)
BUN SERPL-MCNC: 9 MG/DL (ref 6–20)
BUN/CREAT SERPL: 11 (ref 12–20)
CALCIUM SERPL-MCNC: 8.7 MG/DL (ref 8.5–10.1)
CHLORIDE SERPL-SCNC: 109 MMOL/L (ref 97–108)
CO2 SERPL-SCNC: 24 MMOL/L (ref 21–32)
CREAT SERPL-MCNC: 0.79 MG/DL (ref 0.55–1.02)
GLOBULIN SER CALC-MCNC: 4.6 G/DL (ref 2–4)
GLUCOSE SERPL-MCNC: 103 MG/DL (ref 65–100)
LACTATE SERPL-SCNC: 1.1 MMOL/L (ref 0.4–2)
POTASSIUM SERPL-SCNC: 3.3 MMOL/L (ref 3.5–5.1)
PROT SERPL-MCNC: 8.1 G/DL (ref 6.4–8.2)
SODIUM SERPL-SCNC: 138 MMOL/L (ref 136–145)

## 2020-02-26 PROCEDURE — 96376 TX/PRO/DX INJ SAME DRUG ADON: CPT

## 2020-02-26 PROCEDURE — 65270000029 HC RM PRIVATE

## 2020-02-26 PROCEDURE — 96375 TX/PRO/DX INJ NEW DRUG ADDON: CPT

## 2020-02-26 PROCEDURE — 74011000258 HC RX REV CODE- 258: Performed by: SURGERY

## 2020-02-26 PROCEDURE — 74011250637 HC RX REV CODE- 250/637: Performed by: COLON & RECTAL SURGERY

## 2020-02-26 PROCEDURE — 74011250636 HC RX REV CODE- 250/636: Performed by: COLON & RECTAL SURGERY

## 2020-02-26 PROCEDURE — 74011250636 HC RX REV CODE- 250/636: Performed by: SURGERY

## 2020-02-26 PROCEDURE — 74177 CT ABD & PELVIS W/CONTRAST: CPT

## 2020-02-26 PROCEDURE — 74176 CT ABD & PELVIS W/O CONTRAST: CPT

## 2020-02-26 PROCEDURE — 74011636320 HC RX REV CODE- 636/320: Performed by: RADIOLOGY

## 2020-02-26 PROCEDURE — 74011250636 HC RX REV CODE- 250/636: Performed by: EMERGENCY MEDICINE

## 2020-02-26 RX ORDER — OXYCODONE HYDROCHLORIDE 5 MG/1
5 TABLET ORAL
Status: DISCONTINUED | OUTPATIENT
Start: 2020-02-26 | End: 2020-02-28 | Stop reason: HOSPADM

## 2020-02-26 RX ORDER — ACETAMINOPHEN 500 MG
1000 TABLET ORAL
COMMUNITY

## 2020-02-26 RX ORDER — HYDROMORPHONE HYDROCHLORIDE 1 MG/ML
1 INJECTION, SOLUTION INTRAMUSCULAR; INTRAVENOUS; SUBCUTANEOUS ONCE
Status: COMPLETED | OUTPATIENT
Start: 2020-02-26 | End: 2020-02-26

## 2020-02-26 RX ORDER — ONDANSETRON 2 MG/ML
4 INJECTION INTRAMUSCULAR; INTRAVENOUS
Status: DISCONTINUED | OUTPATIENT
Start: 2020-02-26 | End: 2020-02-28 | Stop reason: HOSPADM

## 2020-02-26 RX ORDER — POTASSIUM CHLORIDE 750 MG/1
10 TABLET, FILM COATED, EXTENDED RELEASE ORAL 2 TIMES DAILY
Status: DISCONTINUED | OUTPATIENT
Start: 2020-02-26 | End: 2020-02-28 | Stop reason: HOSPADM

## 2020-02-26 RX ORDER — SODIUM CHLORIDE 0.9 % (FLUSH) 0.9 %
5-40 SYRINGE (ML) INJECTION AS NEEDED
Status: DISCONTINUED | OUTPATIENT
Start: 2020-02-26 | End: 2020-02-28 | Stop reason: HOSPADM

## 2020-02-26 RX ORDER — SODIUM CHLORIDE 9 MG/ML
100 INJECTION, SOLUTION INTRAVENOUS CONTINUOUS
Status: DISCONTINUED | OUTPATIENT
Start: 2020-02-26 | End: 2020-02-26

## 2020-02-26 RX ORDER — PROCHLORPERAZINE EDISYLATE 5 MG/ML
10 INJECTION INTRAMUSCULAR; INTRAVENOUS
Status: COMPLETED | OUTPATIENT
Start: 2020-02-26 | End: 2020-02-26

## 2020-02-26 RX ORDER — SODIUM CHLORIDE 0.9 % (FLUSH) 0.9 %
5-40 SYRINGE (ML) INJECTION EVERY 8 HOURS
Status: DISCONTINUED | OUTPATIENT
Start: 2020-02-26 | End: 2020-02-28 | Stop reason: HOSPADM

## 2020-02-26 RX ORDER — HYDROMORPHONE HYDROCHLORIDE 1 MG/ML
0.5 INJECTION, SOLUTION INTRAMUSCULAR; INTRAVENOUS; SUBCUTANEOUS
Status: DISCONTINUED | OUTPATIENT
Start: 2020-02-26 | End: 2020-02-28 | Stop reason: HOSPADM

## 2020-02-26 RX ORDER — DEXTROSE, SODIUM CHLORIDE, AND POTASSIUM CHLORIDE 5; .45; .15 G/100ML; G/100ML; G/100ML
50 INJECTION INTRAVENOUS CONTINUOUS
Status: DISCONTINUED | OUTPATIENT
Start: 2020-02-26 | End: 2020-02-28

## 2020-02-26 RX ORDER — ACETAMINOPHEN 500 MG
1000 TABLET ORAL
Status: DISCONTINUED | OUTPATIENT
Start: 2020-02-26 | End: 2020-02-28 | Stop reason: HOSPADM

## 2020-02-26 RX ORDER — HEPARIN SODIUM 5000 [USP'U]/ML
5000 INJECTION, SOLUTION INTRAVENOUS; SUBCUTANEOUS EVERY 8 HOURS
Status: DISCONTINUED | OUTPATIENT
Start: 2020-02-26 | End: 2020-02-28 | Stop reason: HOSPADM

## 2020-02-26 RX ORDER — IBUPROFEN 200 MG
600 TABLET ORAL
COMMUNITY

## 2020-02-26 RX ORDER — SIMETHICONE 80 MG
80 TABLET,CHEWABLE ORAL
Status: DISCONTINUED | OUTPATIENT
Start: 2020-02-26 | End: 2020-02-27 | Stop reason: SDUPTHER

## 2020-02-26 RX ADMIN — OXYCODONE 5 MG: 5 TABLET ORAL at 11:40

## 2020-02-26 RX ADMIN — PROCHLORPERAZINE EDISYLATE 10 MG: 5 INJECTION INTRAMUSCULAR; INTRAVENOUS at 05:41

## 2020-02-26 RX ADMIN — Medication 10 ML: at 05:02

## 2020-02-26 RX ADMIN — Medication 10 ML: at 14:34

## 2020-02-26 RX ADMIN — HYDROMORPHONE HYDROCHLORIDE 1 MG: 1 INJECTION, SOLUTION INTRAMUSCULAR; INTRAVENOUS; SUBCUTANEOUS at 05:03

## 2020-02-26 RX ADMIN — DEXTROSE MONOHYDRATE, SODIUM CHLORIDE, AND POTASSIUM CHLORIDE 100 ML/HR: 50; 4.5; 1.49 INJECTION, SOLUTION INTRAVENOUS at 11:44

## 2020-02-26 RX ADMIN — POTASSIUM CHLORIDE 10 MEQ: 750 TABLET, FILM COATED, EXTENDED RELEASE ORAL at 11:41

## 2020-02-26 RX ADMIN — ONDANSETRON 4 MG: 2 INJECTION INTRAMUSCULAR; INTRAVENOUS at 23:34

## 2020-02-26 RX ADMIN — OXYCODONE 5 MG: 5 TABLET ORAL at 23:34

## 2020-02-26 RX ADMIN — DEXTROSE MONOHYDRATE, SODIUM CHLORIDE, AND POTASSIUM CHLORIDE 100 ML/HR: 50; 4.5; 1.49 INJECTION, SOLUTION INTRAVENOUS at 20:15

## 2020-02-26 RX ADMIN — Medication 10 ML: at 06:20

## 2020-02-26 RX ADMIN — HEPARIN SODIUM 5000 UNITS: 5000 INJECTION INTRAVENOUS; SUBCUTANEOUS at 14:34

## 2020-02-26 RX ADMIN — IOPAMIDOL 100 ML: 755 INJECTION, SOLUTION INTRAVENOUS at 00:23

## 2020-02-26 RX ADMIN — IOHEXOL 50 ML: 350 INJECTION, SOLUTION INTRAVENOUS at 05:03

## 2020-02-26 RX ADMIN — Medication 10 ML: at 05:03

## 2020-02-26 RX ADMIN — HYDROMORPHONE HYDROCHLORIDE 1 MG: 1 INJECTION, SOLUTION INTRAMUSCULAR; INTRAVENOUS; SUBCUTANEOUS at 00:13

## 2020-02-26 RX ADMIN — HYDROMORPHONE HYDROCHLORIDE 1 MG: 1 INJECTION, SOLUTION INTRAMUSCULAR; INTRAVENOUS; SUBCUTANEOUS at 02:25

## 2020-02-26 RX ADMIN — HEPARIN SODIUM 5000 UNITS: 5000 INJECTION INTRAVENOUS; SUBCUTANEOUS at 06:22

## 2020-02-26 RX ADMIN — ONDANSETRON 4 MG: 2 INJECTION INTRAMUSCULAR; INTRAVENOUS at 02:23

## 2020-02-26 RX ADMIN — PIPERACILLIN AND TAZOBACTAM 3.38 G: 3; .375 INJECTION, POWDER, LYOPHILIZED, FOR SOLUTION INTRAVENOUS at 14:32

## 2020-02-26 RX ADMIN — FAMOTIDINE 20 MG: 10 INJECTION, SOLUTION INTRAVENOUS at 11:40

## 2020-02-26 RX ADMIN — HEPARIN SODIUM 5000 UNITS: 5000 INJECTION INTRAVENOUS; SUBCUTANEOUS at 22:47

## 2020-02-26 RX ADMIN — Medication 10 ML: at 22:48

## 2020-02-26 RX ADMIN — OXYCODONE 5 MG: 5 TABLET ORAL at 17:37

## 2020-02-26 RX ADMIN — Medication 10 ML: at 22:49

## 2020-02-26 RX ADMIN — POTASSIUM CHLORIDE 10 MEQ: 750 TABLET, FILM COATED, EXTENDED RELEASE ORAL at 17:00

## 2020-02-26 RX ADMIN — PIPERACILLIN AND TAZOBACTAM 3.38 G: 3; .375 INJECTION, POWDER, LYOPHILIZED, FOR SOLUTION INTRAVENOUS at 22:47

## 2020-02-26 RX ADMIN — FAMOTIDINE 20 MG: 10 INJECTION, SOLUTION INTRAVENOUS at 22:47

## 2020-02-26 NOTE — ED PROVIDER NOTES
The history is provided by the patient. Abdominal Pain    This is a new problem. The current episode started 3 to 5 hours ago. The problem occurs constantly. The problem has been rapidly worsening. Associated with: first bowel movement after recent surgical procedure. The pain is located in the RUQ and RLQ (right flank). The quality of the pain is sharp and aching. The pain is severe. Pertinent negatives include no fever, no flatus, no hematochezia, no melena, no vomiting, no constipation, no dysuria and no frequency. The pain is worsened by bowel movements. The pain is relieved by nothing.         Past Medical History:   Diagnosis Date    Autoimmune disease ([facility] [address]     Chron's    Fatigue     Headache     Ill-defined condition     Migraines       Past Surgical History:   Procedure Laterality Date    HX GI      Several colonoscopies    HX GYN      Epidural with vaginal deliveries X 3    HX TONSILLECTOMY           Family History:   Problem Relation Age of Onset    Cancer Maternal Grandmother     Headache Maternal Grandmother     Cancer Paternal Grandmother     Cancer Other        Social History     Socioeconomic History    Marital status:      Spouse name: Not on file    Number of children: Not on file    Years of education: Not on file    Highest education level: Not on file   Occupational History    Not on file   Social Needs    Financial resource strain: Not on file    Food insecurity:     Worry: Not on file     Inability: Not on file    Transportation needs:     Medical: Not on file     Non-medical: Not on file   Tobacco Use    Smoking status: Never Smoker    Smokeless tobacco: Never Used   Substance and Sexual Activity    Alcohol use: Yes     Comment: socially    Drug use: No    Sexual activity: Not on file   Lifestyle    Physical activity:     Days per week: Not on file     Minutes per session: Not on file    Stress: Not on file   Relationships    Social connections:     Talks on phone: Not on file     Gets together: Not on file     Attends [employer] service: Not on file     Active member of club or organization: Not on file     Attends meetings of clubs or organizations: Not on file     Relationship status: Not on file    Intimate partner violence:     Fear of current or ex partner: Not on file     Emotionally abused: Not on file     Physically abused: Not on file     Forced sexual activity: Not on file   Other Topics Concern    Not on file   Social History Narrative    Not on file         ALLERGIES: Patient has no known allergies. Review of Systems   Constitutional: Negative for fever. Gastrointestinal: Positive for abdominal pain. Negative for constipation, flatus, hematochezia, melena and vomiting. Genitourinary: Negative for dysuria and frequency. All other systems reviewed and are negative. Vitals:    02/26/20 0030 02/26/20 0045 02/26/20 0100 02/26/20 0115   BP: (!) 145/94 130/87 135/87 139/82   Pulse:       Resp:       Temp:       SpO2: 98% 100% 100% 98%   Weight:       Height:                Physical Exam  Vitals signs and nursing note reviewed. Constitutional:       General: She is not in acute distress. Appearance: She is well-developed. HENT:      Head: Normocephalic and atraumatic. Eyes:      Conjunctiva/sclera: Conjunctivae normal.   Neck:      Musculoskeletal: Neck supple. Cardiovascular:      Rate and Rhythm: Normal rate and regular rhythm. Heart sounds: Normal heart sounds. Pulmonary:      Effort: Pulmonary effort is normal. No respiratory distress. Breath sounds: Normal breath sounds. Abdominal:      Tenderness: There is abdominal tenderness. There is guarding and rebound. Comments: Peritonitis right side of abdomen concentrated in RUQ. Referred right sided pain from left side palpation. Slightly rigid on right side. Musculoskeletal: Normal range of motion. General: No deformity.    Skin:     General: Skin is warm and dry. Neurological:      Mental Status: She is alert. Cranial Nerves: No cranial nerve deficit. Psychiatric:         Behavior: Behavior normal.          MDM     17-year-old female with history of Crohn's disease status post recent ileocecectomy 8 days ago presents with sudden onset of right-sided abdominal pain that was severe in nature and brought her to the emergency department. Her initial exam is highly concerning for localized peritonitis on the right hemiabdomen with referred pain from palpation of the left. Given the nature of her recent surgery expedited CT abdomen pelvis with IV contrast was ordered to evaluate for abscess, perforation or other time sensitive lesions. There is a small amount of free fluid surrounding the area of the anastomosis concerning for early leak which could be leading to peritonitis. I discussed with on-call colorectal surgeon who will come to evaluate the patient for admission and recommended repeat study with oral contrast waiting 2 hours after administration prior to scanning. Required multiple doses of IV narcotic analgesia for pain control, plan will be for admission to colorectal service. Procedures    6:00 AM  Dr Malathi Burns at bedside evaluating patient for admission.

## 2020-02-26 NOTE — PROGRESS NOTES
2/26/2020 12:20 PM EMR reviewed. Met with pt. Reason for Readmission:  Abdominal pain POD#8 s/p ileocolectomy for Crohn's stricture. RUR Score/Risk Level:  8% low risk       PCP: First and Last name:  Goldy Verde   Name of Practice: [facility]   Are you a current patient: Yes/No: Yes   Approximate date of last visit: 1/24/2020    Is a Care Conference indicated: No      Did you attend your follow up appointment (s): If not, why not:  Pt reported her follow up appt is scheduled with Dr. Ramos Rodas on 3/3/2020. Resources/supports as identified by patient/family:  Supportive        Top Challenges facing patient (as identified by patient/family and CM): Finances/Medication cost? No concerns, pt reported she had no issues filling medications at discharge last time. Pt fills scripts at [facility].      Transportation  No concerns, pt's  will transport pt home at discharge and to follow up appts. Support system or lack thereof? No concerns, supportive family      Living arrangements? No concerns, lives with her  in a 2 story home in [place]. There are 4 steps to enter and 1 flight of stairs to pt's bedroom. Self-care/ADLs/Cognition? No concerns, independent with adls, A&Ox4        Current Advanced Directive/Advance Care Plan:      Nyla Rodas [phone]            Plan for utilizing home health:  Not indicated at this time              Transition of Care Plan:    Based on readmission, the patient's previous Plan of Care   has been evaluated and/or modified. The current Transition of Care Plan is: return home with family and close follow up with Dr. Ramos Rodas in his office. CM will follow for potential discharge needs. CHERELLE Araujo    Care Management Interventions  PCP Verified by CM: Yes(Zeyad Booker )  Palliative Care Criteria Met (RRAT>21 & CHF Dx)?: No  Mode of Transport at Discharge:  Other (see comment)(Pt's )  Gayle Signup: No  Discharge Durable Medical Equipment: No  Physical Therapy Consult: No  Occupational Therapy Consult: No  Speech Therapy Consult: No  Current Support Network: Lives with Spouse  Confirm Follow Up Transport: Family  Discharge Location  Discharge Placement: Home with family assistance

## 2020-02-26 NOTE — ED NOTES
Bedside and Verbal shift change report given to [employer], RN  (oncoming nurse) by Freida Balderrama RN  (offgoing nurse). Report included the following information SBAR and ED Summary.

## 2020-02-26 NOTE — ED NOTES
Rounding on patient. Denies need for pain medication at this time. Respirations even and unlabored. Mother at bedside provided water for oral hydration.

## 2020-02-26 NOTE — ED NOTES
TRANSFER - OUT REPORT:    Verbal report given to [address], RN (name) on Andres Crawford  being transferred to surgical (unit) for routine progression of care       Report consisted of patients Situation, Background, Assessment and   Recommendations(SBAR). Information from the following report(s) SBAR, ED Summary and Intake/Output was reviewed with the receiving nurse. Lines:       Opportunity for questions and clarification was provided.       Patient transported with:   [email or web address]

## 2020-02-26 NOTE — PROGRESS NOTES
Spiritual Care Partner Volunteer visited patient on the Post Surgical Ortho unit on 2/26/20. Documented by:  Kristal Rivera.      Paging Service: [phone]

## 2020-02-26 NOTE — ED TRIAGE NOTES
Had surgery on Tues, had appendectomy and ?small intestine surgery but unsure what  Had her first BM this evening, developed pain on R side afterwards, pain has been increasing since, has had low-grade fever tonight but children have been sick recently  Called on call surgeon, Dr Rosa Favors did surgery, was told that she should not be having pain and to come to ED for evaluation  States that incision look normal and no drainage

## 2020-02-26 NOTE — PROGRESS NOTES
Admission Medication Reconciliation:     Information obtained from:    Patient  RxQuery data available¹:  YES    Comments/Recommendations:   Updated PTA medication list  Verified preferred pharmacy  Reviewed patient's allergies     ¹RxQuery pharmacy benefit data reflects medications filled and processed through the patient's insurance, however   this data does NOT capture whether the medication was picked up or is currently being taken by the patient. Prior to Admission Medications   Prescriptions Last Dose Informant Taking?   acetaminophen (TYLENOL) 500 mg tablet 2/25/2020 at Unknown time Self Yes   Sig: Take 1,000 mg by mouth every six (6) hours as needed for Pain. ibuprofen (ADVIL) 200 mg tablet  Self Yes   Sig: Take 600 mg by mouth every six (6) hours as needed for Pain. infliximab (REMICADE IV) 2/7/2020 Self Yes   Sig: by IntraVENous route. Not sure of dosage takes every 8 weeks IV infusion   montelukast (SINGULAIR) 10 mg tablet 2/19/2020 at Unknown time Self Yes   Sig: Take 10 mg by mouth daily. ondansetron (ZOFRAN ODT) 8 mg disintegrating tablet  Self Yes   Sig: Take 1 Tab by mouth every eight (8) hours as needed for Nausea or Vomiting. oxyCODONE IR (ROXICODONE) 5 mg immediate release tablet 2/25/2020 at Unknown time Self Yes   Sig: Take 1 Tab by mouth every six (6) hours as needed for Pain for up to 8 days. Max Daily Amount: 20 mg.   topiramate (TOPAMAX) 50 mg tablet 2/25/2020 at Unknown time Self Yes   Sig: Take 100 mg by mouth every evening. Indications: migraine prevention      Facility-Administered Medications: None         Please contact the main inpatient pharmacy with any questions or concerns at [phone] and we will direct you to the clinical pharmacist covering this patient's care while in-house.    Michelle Padilla, PharmD, BCPS

## 2020-02-26 NOTE — H&P
History and Physical    Patient: Laura Jones MRN: [id number]  SSN: [id number]    YOB: 1985  Age: 37 y.o. Sex: female      Subjective:      Laura Jones is a 37 y.o. female who presents with abdominal pain POD#8 s/p ileocolectomy for Crohn's stricture. She reports having severe right sided abdominal pain right after having a BM last night. Had some nausea but no vomiting. No fevers       Past Medical History:   Diagnosis Date    Autoimmune disease ([address]     Chron's    Fatigue     Headache     Ill-defined condition     Migraines     Past Surgical History:   Procedure Laterality Date    HX GI      Several colonoscopies    HX GYN      Epidural with vaginal deliveries X 3    HX TONSILLECTOMY        Family History   Problem Relation Age of Onset    Cancer Maternal Grandmother     Headache Maternal Grandmother     Cancer Paternal Grandmother     Cancer Other      Social History     Tobacco Use    Smoking status: Never Smoker    Smokeless tobacco: Never Used   Substance Use Topics    Alcohol use: Yes     Comment: socially      Prior to Admission medications    Medication Sig Start Date End Date Taking? Authorizing Provider   oxyCODONE IR (ROXICODONE) 5 mg immediate release tablet Take 1 Tab by mouth every six (6) hours as needed for Pain for up to 8 days. Max Daily Amount: 20 mg. 2/21/20 2/29/20 Yes Shira Ramírez MD   ondansetron (ZOFRAN ODT) 8 mg disintegrating tablet Take 1 Tab by mouth every eight (8) hours as needed for Nausea or Vomiting. 2/21/20  Yes Shira Ramírez MD   topiramate (TOPAMAX) 50 mg tablet Take 100 mg by mouth every evening. Yes Provider, Historical   ondansetron (ZOFRAN ODT) 4 mg disintegrating tablet Take 1 Tab by mouth every eight (8) hours as needed for Nausea. 12/31/19  Yes Rocio Huber MD   montelukast (SINGULAIR) 10 mg tablet Take 10 mg by mouth daily. Provider, Historical   infliximab (REMICADE IV) by IntraVENous route.  Not sure of dosage takes every 8 weeks IV infusion    Provider, Historical   L.acid/L.casei/B.bif/B.sindy/FOS (PROBIOTIC BLEND PO) Take 1 Cap by mouth daily. Provider, Historical        No Known Allergies    Review of Systems:  A comprehensive review of systems was negative except for that written in the History of Present Illness. Objective:     Vitals:    02/26/20 0330 02/26/20 0345 02/26/20 0457 02/26/20 0458   BP: 129/80 135/84 135/85    Pulse:   (!) 101    Resp:   18    Temp:   99 °F (37.2 °C)    SpO2: 99% 100% 100% 100%   Weight:       Height:            Physical Exam:  General:  Alert, cooperative, no distress, appears stated age. Eyes:  Conjunctivae/corneas clear. PERRL, EOMs intact. Fundi benign   Ears:  Normal TMs and external ear canals both ears. Nose: Nares normal. Septum midline. Mucosa normal. No drainage or sinus tenderness. Mouth/Throat: Lips, mucosa, and tongue normal. Teeth and gums normal.   Neck: Supple, symmetrical, trachea midline, no adenopathy, thyroid: no enlargment/tenderness/nodules, no carotid bruit and no JVD. Back:   Symmetric, no curvature. ROM normal. No CVA tenderness. Lungs:   Clear to auscultation bilaterally. Heart:  Regular rate and rhythm, S1, S2 normal, no murmur, click, rub or gallop. Abdomen:   Soft, TTP just to the right of midline and above the umbilicus. Very tender, but also very focal.  No diffuse peritonitis. Bowel sounds normal. No masses,  No organomegaly. Extremities: Extremities normal, atraumatic, no cyanosis or edema. Pulses: 2+ and symmetric all extremities. Skin: Skin color, texture, turgor normal. No rashes or lesions   Lymph nodes: Cervical, supraclavicular, and axillary nodes normal.   Neurologic: CNII-XII intact. Normal strength, sensation and reflexes throughout. Assessment:     Hospital Problems  Date Reviewed: 2/17/2016    None          Plan:     34F POD#8 s/p ileocolectomy. Presents with severe pain and leukocytosis.   Repeat CT with PO contrast showed no evidence of anastomotic leak (final read pending). Will admit to the surgical floor, NPO, IV fluids, start zosyn empirically.       Signed By: Yaritza Bey MD     February 26, 2020

## 2020-02-26 NOTE — PROGRESS NOTES
CRS  Pt re-evaluated. Still with right sided abdominal pain. +flatus, no stool since yesterday  Flowsheet, labs, Ct reviewed  Abd: voluntary guarding RUQ, left abdomen nontender, no guarding, no rebound    Plan:  NPO, IVF. Recheck labs in am. If pain increases, fever, then would repeat CT to rule out free air. Clinically stable at present.  Replete K

## 2020-02-27 LAB
ANION GAP SERPL CALC-SCNC: 4 MMOL/L (ref 5–15)
BASOPHILS # BLD: 0 K/UL (ref 0–0.1)
BASOPHILS NFR BLD: 0 % (ref 0–1)
BUN SERPL-MCNC: 4 MG/DL (ref 6–20)
BUN/CREAT SERPL: 7 (ref 12–20)
CALCIUM SERPL-MCNC: 8.3 MG/DL (ref 8.5–10.1)
CHLORIDE SERPL-SCNC: 112 MMOL/L (ref 97–108)
CO2 SERPL-SCNC: 22 MMOL/L (ref 21–32)
CREAT SERPL-MCNC: 0.61 MG/DL (ref 0.55–1.02)
DIFFERENTIAL METHOD BLD: ABNORMAL
EOSINOPHIL # BLD: 0.2 K/UL (ref 0–0.4)
EOSINOPHIL NFR BLD: 3 % (ref 0–7)
ERYTHROCYTE [DISTWIDTH] IN BLOOD BY AUTOMATED COUNT: 12.3 % (ref 11.5–14.5)
GLUCOSE SERPL-MCNC: 108 MG/DL (ref 65–100)
HCT VFR BLD AUTO: 29.6 % (ref 35–47)
HGB BLD-MCNC: 9.5 G/DL (ref 11.5–16)
IMM GRANULOCYTES # BLD AUTO: 0.1 K/UL (ref 0–0.04)
IMM GRANULOCYTES NFR BLD AUTO: 1 % (ref 0–0.5)
LYMPHOCYTES # BLD: 1.9 K/UL (ref 0.8–3.5)
LYMPHOCYTES NFR BLD: 23 % (ref 12–49)
MAGNESIUM SERPL-MCNC: 2 MG/DL (ref 1.6–2.4)
MCH RBC QN AUTO: 30.4 PG (ref 26–34)
MCHC RBC AUTO-ENTMCNC: 32.1 G/DL (ref 30–36.5)
MCV RBC AUTO: 94.9 FL (ref 80–99)
MONOCYTES # BLD: 0.6 K/UL (ref 0–1)
MONOCYTES NFR BLD: 7 % (ref 5–13)
NEUTS SEG # BLD: 5.7 K/UL (ref 1.8–8)
NEUTS SEG NFR BLD: 66 % (ref 32–75)
NRBC # BLD: 0 K/UL (ref 0–0.01)
NRBC BLD-RTO: 0 PER 100 WBC
PHOSPHATE SERPL-MCNC: 2.7 MG/DL (ref 2.6–4.7)
PLATELET # BLD AUTO: 270 K/UL (ref 150–400)
PMV BLD AUTO: 9.6 FL (ref 8.9–12.9)
POTASSIUM SERPL-SCNC: 4 MMOL/L (ref 3.5–5.1)
RBC # BLD AUTO: 3.12 M/UL (ref 3.8–5.2)
SODIUM SERPL-SCNC: 138 MMOL/L (ref 136–145)
WBC # BLD AUTO: 8.6 K/UL (ref 3.6–11)

## 2020-02-27 PROCEDURE — 83735 ASSAY OF MAGNESIUM: CPT

## 2020-02-27 PROCEDURE — 84100 ASSAY OF PHOSPHORUS: CPT

## 2020-02-27 PROCEDURE — 65270000029 HC RM PRIVATE

## 2020-02-27 PROCEDURE — 74011250637 HC RX REV CODE- 250/637: Performed by: COLON & RECTAL SURGERY

## 2020-02-27 PROCEDURE — 80048 BASIC METABOLIC PNL TOTAL CA: CPT

## 2020-02-27 PROCEDURE — 74011250636 HC RX REV CODE- 250/636: Performed by: SURGERY

## 2020-02-27 PROCEDURE — 85025 COMPLETE CBC W/AUTO DIFF WBC: CPT

## 2020-02-27 PROCEDURE — 36415 COLL VENOUS BLD VENIPUNCTURE: CPT

## 2020-02-27 PROCEDURE — 74011000258 HC RX REV CODE- 258: Performed by: SURGERY

## 2020-02-27 PROCEDURE — 74011250636 HC RX REV CODE- 250/636: Performed by: COLON & RECTAL SURGERY

## 2020-02-27 RX ORDER — FAMOTIDINE 20 MG/1
20 TABLET, FILM COATED ORAL 2 TIMES DAILY
Status: DISCONTINUED | OUTPATIENT
Start: 2020-02-27 | End: 2020-02-28 | Stop reason: HOSPADM

## 2020-02-27 RX ORDER — SIMETHICONE 80 MG
80 TABLET,CHEWABLE ORAL
Status: DISCONTINUED | OUTPATIENT
Start: 2020-02-27 | End: 2020-02-28 | Stop reason: HOSPADM

## 2020-02-27 RX ADMIN — POTASSIUM CHLORIDE 10 MEQ: 750 TABLET, FILM COATED, EXTENDED RELEASE ORAL at 18:27

## 2020-02-27 RX ADMIN — FAMOTIDINE 20 MG: 10 INJECTION, SOLUTION INTRAVENOUS at 09:17

## 2020-02-27 RX ADMIN — PIPERACILLIN AND TAZOBACTAM 3.38 G: 3; .375 INJECTION, POWDER, LYOPHILIZED, FOR SOLUTION INTRAVENOUS at 06:08

## 2020-02-27 RX ADMIN — ONDANSETRON 4 MG: 2 INJECTION INTRAMUSCULAR; INTRAVENOUS at 16:52

## 2020-02-27 RX ADMIN — ONDANSETRON 4 MG: 2 INJECTION INTRAMUSCULAR; INTRAVENOUS at 22:16

## 2020-02-27 RX ADMIN — OXYCODONE 5 MG: 5 TABLET ORAL at 09:17

## 2020-02-27 RX ADMIN — OXYCODONE 5 MG: 5 TABLET ORAL at 16:50

## 2020-02-27 RX ADMIN — Medication 10 ML: at 06:08

## 2020-02-27 RX ADMIN — HEPARIN SODIUM 5000 UNITS: 5000 INJECTION INTRAVENOUS; SUBCUTANEOUS at 13:37

## 2020-02-27 RX ADMIN — SIMETHICONE CHEW TAB 80 MG 80 MG: 80 TABLET ORAL at 16:50

## 2020-02-27 RX ADMIN — PIPERACILLIN AND TAZOBACTAM 3.38 G: 3; .375 INJECTION, POWDER, LYOPHILIZED, FOR SOLUTION INTRAVENOUS at 22:07

## 2020-02-27 RX ADMIN — FAMOTIDINE 20 MG: 20 TABLET ORAL at 18:27

## 2020-02-27 RX ADMIN — HEPARIN SODIUM 5000 UNITS: 5000 INJECTION INTRAVENOUS; SUBCUTANEOUS at 22:13

## 2020-02-27 RX ADMIN — PIPERACILLIN AND TAZOBACTAM 3.38 G: 3; .375 INJECTION, POWDER, LYOPHILIZED, FOR SOLUTION INTRAVENOUS at 13:36

## 2020-02-27 RX ADMIN — POTASSIUM CHLORIDE 10 MEQ: 750 TABLET, FILM COATED, EXTENDED RELEASE ORAL at 09:17

## 2020-02-27 RX ADMIN — HEPARIN SODIUM 5000 UNITS: 5000 INJECTION INTRAVENOUS; SUBCUTANEOUS at 06:07

## 2020-02-27 NOTE — CDMP QUERY
Patient admitted with postop abdominal pain POD#8 s/p ileocolectomy for Crohn's stricture. 
  
Noted documentation in the ED notes of \" Her initial exam is highly concerning for localized peritonitis on the right hemiabdomen with referred pain from palpation of the left. CT abdomen pelvis: There is a small amount of free fluid surrounding the area of the anastomosis concerning for early leak which could be leading to peritonitis. \" Please specify in your progress notes and dc summary the cause of the patients abd pain on admission.  
 
=> Sepsis POA 2/2  intra-abdominal infection following  Ileocolectomy 
=>  intra-abdominal infection following  Ileocolectomy, No sepsis  
=> Other explanation  
=> Unable to determine The medical record reflects the following:   
   Risk Factors: 38 yo F admitted with postop abdominal pain Clinical Indicators: WBC 14.8 Neutrophils 82% Pulse 116 ED note states \" concerning for localized peritonitis\" H&P states \"No diffuse peritonitis. \" \"Repeat CT with PO contrast showed no evidence of anastomotic leak \" Treatment: IV NS 1L bolus, IV zosyn Thank you for your time [facility] RN/BSN, CCDS Desk:   [phone] Other:  [phone]

## 2020-02-27 NOTE — CDMP QUERY
2) Patient admitted with abd pain following recent surgery. Noted documentation of \"Replete K\" Please specify In your progress notes if you are treating the following:  
 
=> Hypokalemia 
=> Other explanation  
=> Unable to determine The medical record reflects the following:   
   Risk Factors: 38 yo F admitted with abd pain s/p recent surgery Clinical Indicators: 2/26 K 3.3 Treatment: PO Klor-con 10mg  BID Thank you for your time [facility] RN/BSN, CCDS Desk:   [phone] Other:  [phone]

## 2020-02-27 NOTE — PROGRESS NOTES
2/27/2020 2:55 PM EMR reviewed and pt observed ambulating with family in hallway. CM will follow for discharge needs, none identified at this time.   CHERELLE Zambrano    Discharge plan:  Home with family and close office follow up with Dr. Dodie Carlton

## 2020-02-27 NOTE — PROGRESS NOTES
CRS  Pt feels better. Hungry last night. Flatus.  No stool  Flowsheet, labs reviewed  Abd: soft, nt left abd, less tender right without guarding    Plan:  Decrease ivf, full liquids

## 2020-02-27 NOTE — PROGRESS NOTES
Bedside shift change report given to Ijeoma (oncoming nurse) by Pinky Andrade (offgoing nurse). Report included the following information SBAR, Kardex, Intake/Output and Recent Results. Bedside shift change report given to Russ Medina (oncoming nurse) by Mei Tabor (offgoing nurse). Report included the following information SBAR, Kardex, Intake/Output and Recent Results.

## 2020-02-27 NOTE — PROGRESS NOTES
[facility] Dosing Services: IV to PO Conversion    The pharmacist has determined that this patient meets P & T approved criteria for conversion from IV to oral therapy for the following medication:Famotidine 20 mg IV q12hr    The pharmacist has written the following order for the patient: Famotidine 20 mg po BID  The pharmacist will continue to monitor the patient's status and advise the physician if conversion back to IV therapy is recommended.     Signed Agustina Bear Contact information:  [phone]

## 2020-02-28 VITALS
DIASTOLIC BLOOD PRESSURE: 83 MMHG | RESPIRATION RATE: 16 BRPM | TEMPERATURE: 98.3 F | HEIGHT: 71 IN | HEART RATE: 75 BPM | SYSTOLIC BLOOD PRESSURE: 135 MMHG | WEIGHT: 181 LBS | BODY MASS INDEX: 25.34 KG/M2 | OXYGEN SATURATION: 96 %

## 2020-02-28 LAB
ANION GAP SERPL CALC-SCNC: 7 MMOL/L (ref 5–15)
BASOPHILS # BLD: 0 K/UL (ref 0–0.1)
BASOPHILS NFR BLD: 0 % (ref 0–1)
BUN SERPL-MCNC: 3 MG/DL (ref 6–20)
BUN/CREAT SERPL: 5 (ref 12–20)
CALCIUM SERPL-MCNC: 8.6 MG/DL (ref 8.5–10.1)
CHLORIDE SERPL-SCNC: 110 MMOL/L (ref 97–108)
CO2 SERPL-SCNC: 23 MMOL/L (ref 21–32)
CREAT SERPL-MCNC: 0.57 MG/DL (ref 0.55–1.02)
DIFFERENTIAL METHOD BLD: ABNORMAL
EOSINOPHIL # BLD: 0.2 K/UL (ref 0–0.4)
EOSINOPHIL NFR BLD: 3 % (ref 0–7)
ERYTHROCYTE [DISTWIDTH] IN BLOOD BY AUTOMATED COUNT: 12.1 % (ref 11.5–14.5)
GLUCOSE SERPL-MCNC: 88 MG/DL (ref 65–100)
HCT VFR BLD AUTO: 29.1 % (ref 35–47)
HGB BLD-MCNC: 9.6 G/DL (ref 11.5–16)
IMM GRANULOCYTES # BLD AUTO: 0.1 K/UL (ref 0–0.04)
IMM GRANULOCYTES NFR BLD AUTO: 1 % (ref 0–0.5)
LYMPHOCYTES # BLD: 1.9 K/UL (ref 0.8–3.5)
LYMPHOCYTES NFR BLD: 27 % (ref 12–49)
MCH RBC QN AUTO: 30.5 PG (ref 26–34)
MCHC RBC AUTO-ENTMCNC: 33 G/DL (ref 30–36.5)
MCV RBC AUTO: 92.4 FL (ref 80–99)
MONOCYTES # BLD: 0.5 K/UL (ref 0–1)
MONOCYTES NFR BLD: 8 % (ref 5–13)
NEUTS SEG # BLD: 4.2 K/UL (ref 1.8–8)
NEUTS SEG NFR BLD: 61 % (ref 32–75)
NRBC # BLD: 0 K/UL (ref 0–0.01)
NRBC BLD-RTO: 0 PER 100 WBC
PLATELET # BLD AUTO: 304 K/UL (ref 150–400)
PMV BLD AUTO: 9.7 FL (ref 8.9–12.9)
POTASSIUM SERPL-SCNC: 3.8 MMOL/L (ref 3.5–5.1)
RBC # BLD AUTO: 3.15 M/UL (ref 3.8–5.2)
SODIUM SERPL-SCNC: 140 MMOL/L (ref 136–145)
WBC # BLD AUTO: 6.9 K/UL (ref 3.6–11)

## 2020-02-28 PROCEDURE — 74011000258 HC RX REV CODE- 258: Performed by: SURGERY

## 2020-02-28 PROCEDURE — 85025 COMPLETE CBC W/AUTO DIFF WBC: CPT

## 2020-02-28 PROCEDURE — 77030027138 HC INCENT SPIROMETER -A

## 2020-02-28 PROCEDURE — 36415 COLL VENOUS BLD VENIPUNCTURE: CPT

## 2020-02-28 PROCEDURE — 80048 BASIC METABOLIC PNL TOTAL CA: CPT

## 2020-02-28 PROCEDURE — 74011250637 HC RX REV CODE- 250/637: Performed by: COLON & RECTAL SURGERY

## 2020-02-28 PROCEDURE — 90471 IMMUNIZATION ADMIN: CPT

## 2020-02-28 PROCEDURE — 74011250636 HC RX REV CODE- 250/636: Performed by: SURGERY

## 2020-02-28 PROCEDURE — 90686 IIV4 VACC NO PRSV 0.5 ML IM: CPT | Performed by: SURGERY

## 2020-02-28 PROCEDURE — 74011250636 HC RX REV CODE- 250/636: Performed by: COLON & RECTAL SURGERY

## 2020-02-28 RX ORDER — ONDANSETRON 8 MG/1
8 TABLET, ORALLY DISINTEGRATING ORAL
Qty: 30 TAB | Refills: 0 | Status: SHIPPED | OUTPATIENT
Start: 2020-02-28

## 2020-02-28 RX ORDER — METRONIDAZOLE 500 MG/1
500 TABLET ORAL 3 TIMES DAILY
Qty: 21 TAB | Refills: 0 | Status: SHIPPED | OUTPATIENT
Start: 2020-02-28 | End: 2020-03-06

## 2020-02-28 RX ORDER — CIPROFLOXACIN 500 MG/1
500 TABLET ORAL EVERY 12 HOURS
Qty: 14 TAB | Refills: 0 | Status: SHIPPED | OUTPATIENT
Start: 2020-02-28 | End: 2020-03-06

## 2020-02-28 RX ORDER — FAMOTIDINE 20 MG/1
20 TABLET, FILM COATED ORAL 2 TIMES DAILY
Qty: 60 TAB | Refills: 1 | Status: SHIPPED | OUTPATIENT
Start: 2020-02-28

## 2020-02-28 RX ADMIN — ONDANSETRON 4 MG: 2 INJECTION INTRAMUSCULAR; INTRAVENOUS at 09:10

## 2020-02-28 RX ADMIN — OXYCODONE 5 MG: 5 TABLET ORAL at 06:26

## 2020-02-28 RX ADMIN — POTASSIUM CHLORIDE 10 MEQ: 750 TABLET, FILM COATED, EXTENDED RELEASE ORAL at 09:01

## 2020-02-28 RX ADMIN — INFLUENZA VIRUS VACCINE 0.5 ML: 15; 15; 15; 15 SUSPENSION INTRAMUSCULAR at 13:18

## 2020-02-28 RX ADMIN — Medication 10 ML: at 06:23

## 2020-02-28 RX ADMIN — DEXTROSE MONOHYDRATE, SODIUM CHLORIDE, AND POTASSIUM CHLORIDE 50 ML/HR: 50; 4.5; 1.49 INJECTION, SOLUTION INTRAVENOUS at 09:37

## 2020-02-28 RX ADMIN — PIPERACILLIN AND TAZOBACTAM 3.38 G: 3; .375 INJECTION, POWDER, LYOPHILIZED, FOR SOLUTION INTRAVENOUS at 06:22

## 2020-02-28 RX ADMIN — HEPARIN SODIUM 5000 UNITS: 5000 INJECTION INTRAVENOUS; SUBCUTANEOUS at 06:23

## 2020-02-28 RX ADMIN — FAMOTIDINE 20 MG: 20 TABLET ORAL at 09:01

## 2020-02-28 NOTE — PROGRESS NOTES
2/28/2020 1:00 PM EMR reviewed, pt to discharge home today on po abx. Pt's  will transport pt home.  YVONNE PerezW

## 2020-02-28 NOTE — PROGRESS NOTES
CRS  Pt without complaints. +stool.  Pain much improved  Flowsheet, labs reviewed  Abd: soft, nt,  Inc: c/d/i    Plan:  Dc home

## 2020-02-29 NOTE — DISCHARGE SUMMARY
[address] SUMMARY    Name:  Roxy Duarte  MR#:  [id number]  :  1985  ACCOUNT #:  [de-identified]  ADMIT DATE:  2020  DISCHARGE DATE:  2020      ADMITTING DIAGNOSIS:  Postoperative pain. HISTORY OF PRESENT ILLNESS:  This is a very pleasant 49-year-old female who is well known to me for a history of a laparoscopic ileocecectomy for stricturing Crohn disease. She presented to the ER on postoperative day #8 with abdominal pain that had a fairly severe acute onset. CT scan showed contrast through the anastomosis without any extravasation. There were no locules of air. There was no free air. There was some inflammatory stranding. Therefore, she was started empirically on antibiotics, especially as her white count was 14,000. However, with free hydration, her leukocytosis normalized the following day. She was slowly advancing the diet. She was able to be discharged home on 2020, tolerating a diet, having bowel movements. Her white count continued to be normal.    The patient will be discharged home on Cipro and Flagyl. We will continue this for 1 week. She will also be given a prescription for Pepcid and Zofran. She will follow up in the office on Tuesday, as she was already scheduled. She is returned to the office before if she has questions or concerns. I have encouraged her to step into low-residue diet.         MD RICHIE Mercer/[email or web address]  D:  2020 12:30  T:  2020 23:33  JOB #:  [id number]  CC:  Author Apley, MD Duwaine Jock, MD

## 2020-03-17 NOTE — DISCHARGE SUMMARY
[address] SUMMARY    Name:  Marcus Edmond  MR#:  [id number]  :  1985  ACCOUNT #:  [de-identified]  ADMIT DATE:  2020  DISCHARGE DATE:  2020      ADMITTING DIAGNOSIS:  Stricturing Crohn's disease of the ileocecal valve. FINAL DIAGNOSES:  1. Stricturing Crohn's disease of the ileocecal valve. 2.  Status post laparoscopic ileocecetomy (2020). CLINICAL COURSE:  A very pleasant 57-year-old female who is well known to me for a history of Crohn's disease. She has stricturing disease of the ileocecal valve. Therefore, she underwent the above-mentioned surgery. Her postoperative course was fairly unremarkable. She was discharged home tolerating a diet, tolerating pain pills by mouth. She will follow up in the office in 2 weeks or sooner should symptoms warrant.       MD RICHIE Nath/[email or web address]  D:  2020 11:18  T:  2020 21:27  JOB #:  [id number]  CC:  Khris Howell MD

## 2024-10-27 NOTE — BRIEF OP NOTE
BRIEF OPERATIVE NOTE    Date of Procedure: 2/18/2020   Preoperative Diagnosis: Stricturing Crohn's disease of ileo-cecal valve  Postoperative Diagnosis: same   Procedure(s):  LAPAROSCOPIC ILEOCECECTOMY  Surgeon(s) and Role:     * Julita Chritsopher MD - Primary         Surgical Assistant: Sherry Arellano    Surgical Staff:  Circ-1: Vladimir Valdez RN  Circ-Relief: Deanna Mares RN  Scrub Tech-1: Tin Ga  Scrub Tech-Relief: Murl Metro  Surg Asst-1: Froilan Hagan RN  Event Time In Time Out   Incision Start 7448    Incision Close       Anesthesia: General   Estimated Blood Loss: 50mL  Specimens:   ID Type Source Tests Collected by Time Destination   1 : Ileum & Cecum Preservative Ileum  Julita Christopher MD 2/18/2020 6461 Pathology      Findings: findings c/w stricturing Crohn's disease of IC valve.  No abscess, no phlegmon  Complications: none  Implants: * No implants in log *
No

## (undated) DEVICE — TUBE KT ENDFLTN INSUFFLTN SVL --

## (undated) DEVICE — COVER LT HNDL PLAS RIG 1 PER PK

## (undated) DEVICE — BANDAGE COBAN 4 IN COMPR W4INXL5YD FOAM COHESIVE QUIK STK SELF ADH SFT

## (undated) DEVICE — SLEEVE TRCR L100MM DIA5MM UNIV STBL FOR BLDELSS DIL TIP

## (undated) DEVICE — STAPLER INT L75MM CUT LN L73MM STPL LN L77MM BLU B FRM 8

## (undated) DEVICE — REM POLYHESIVE ADULT PATIENT RETURN ELECTRODE: Brand: VALLEYLAB

## (undated) DEVICE — TOTAL TRAY, 16FR 10ML SIL FOLEY, URN: Brand: MEDLINE

## (undated) DEVICE — SOLUTION LACTATED RINGERS INJECTION USP

## (undated) DEVICE — SUTURE PERMAHAND SZ 0 L30IN NONABSORBABLE BLK SILK BRAID A306H

## (undated) DEVICE — INFECTION CONTROL KIT SYS

## (undated) DEVICE — STERILE POLYISOPRENE POWDER-FREE SURGICAL GLOVES: Brand: PROTEXIS

## (undated) DEVICE — (D)PREP SKN CHLRAPRP APPL 26ML -- CONVERT TO ITEM 371833

## (undated) DEVICE — STRAP,POSITIONING,KNEE/BODY,FOAM,4X60": Brand: MEDLINE

## (undated) DEVICE — SYR 10ML LUER LOK 1/5ML GRAD --

## (undated) DEVICE — CANISTER, RIGID, 3000CC: Brand: MEDLINE INDUSTRIES, INC.

## (undated) DEVICE — SUTURE PERMAHAND SZ 3-0 L30IN NONABSORBABLE BLK L26MM SH C017D

## (undated) DEVICE — SEALER TISS L35CM DIA5MM CRV JAW ARTC ADV BPLR ENSEAL G2

## (undated) DEVICE — SUTURE PERMAHAND SZ 3-0 L18IN NONABSORBABLE BLK L26MM SH C013D

## (undated) DEVICE — NEEDLE HYPO 22GA L1.5IN BLK S STL HUB POLYPR SHLD REG BVL

## (undated) DEVICE — TROCAR ENDOSCP L100MM DIA12MM STBL SL BLDELSS ENDOPATH XCEL

## (undated) DEVICE — STAPLER INT L34CM 60MM LNG ENDOSCP ARTC PWR + ECHELON FLX

## (undated) DEVICE — SUT ETHLN 2-0 18IN FS BLK --

## (undated) DEVICE — ACCESS PLATFORM FOR MINIMALLY INVASIVE SURGERY.: Brand: GELPORT® LAPAROSCOPIC  SYSTEM

## (undated) DEVICE — YANKAUER,POOLE TIP,STERILE,50/CS: Brand: MEDLINE

## (undated) DEVICE — STERILE-Z MAYO STAND COVERS CLEAR POLYETHYLENE STERILE UNIVERSAL FIT 20 PER CASE: Brand: STERILE-Z

## (undated) DEVICE — SOLUTION IV 1000ML 0.9% SOD CHL

## (undated) DEVICE — SUTURE SZ 0 27IN 5/8 CIR UR-6  TAPER PT VIOLET ABSRB VICRYL J603H

## (undated) DEVICE — DRAPE,REIN 53X77,STERILE: Brand: MEDLINE

## (undated) DEVICE — SUTURE PDS II SZ 0 L36IN ABSRB VLT L36MM CT-1 1/2 CIR Z346H

## (undated) DEVICE — TUBING, SUCTION, 1/4" X 10', STRAIGHT: Brand: MEDLINE

## (undated) DEVICE — TROCAR ENDOSCP L100MM DIA5MM BLDELSS STBL SL OBT RADLUC

## (undated) DEVICE — COLON CLOSING PACK: Brand: MEDLINE INDUSTRIES, INC.

## (undated) DEVICE — SPONGE LAP 18X18IN STRL -- 5/PK

## (undated) DEVICE — SURGICAL PROCEDURE PACK TISS 3X5 IN ABSORBABLE SEPRAFILM

## (undated) DEVICE — Device

## (undated) DEVICE — RESERVOIR,SUCTION,100CC,SILICONE: Brand: MEDLINE

## (undated) DEVICE — SPONGE DRAIN NONWOVEN 4X4IN -- 2/PK

## (undated) DEVICE — RELOAD STPL L75MM OPN H3.8MM CLS 1.5MM WIRE DIA0.2MM REG

## (undated) DEVICE — SUTURE MCRYL SZ 4-0 L18IN ABSRB UD L19MM PS-2 3/8 CIR PRIM Y496G

## (undated) DEVICE — TROCAR: Brand: KII® SLEEVE

## (undated) DEVICE — SYRINGE IRRIG 60ML SFT PLIABLE BLB EZ TO GRP 1 HND USE W/

## (undated) DEVICE — SURGICAL PROCEDURE KIT GEN LAPAROSCOPY LF

## (undated) DEVICE — DERMABOND SKIN ADH 0.7ML -- DERMABOND ADVANCED 12/BX

## (undated) DEVICE — 3M™ IOBAN™ 2 ANTIMICROBIAL INCISE DRAPE 6648EZ: Brand: IOBAN™ 2

## (undated) DEVICE — EVAC SMOKE SEECLEAR XCL -- SEE CLEAR

## (undated) DEVICE — DRAPE FLD WRM W44XL66IN C6L FOR INTRATEMP SYS THERMABASIN

## (undated) DEVICE — YANKAUER,TAPERED BULBOUS TIP,W/O VENT: Brand: MEDLINE

## (undated) DEVICE — VISUALIZATION SYSTEM: Brand: CLEARIFY

## (undated) DEVICE — HEX-LOCKING BLADE ELECTRODE: Brand: EDGE

## (undated) DEVICE — BLANKET WRM AD PREM MISTRAL-AIR

## (undated) DEVICE — BLADE ASSEMB CLP HAIR FINE --

## (undated) DEVICE — DRAIN SURG 19FR 0.25IN SIL RND W/ TRCR INDIC DOT RADPQ FULL